# Patient Record
Sex: FEMALE | Race: BLACK OR AFRICAN AMERICAN | NOT HISPANIC OR LATINO | Employment: UNEMPLOYED | URBAN - METROPOLITAN AREA
[De-identification: names, ages, dates, MRNs, and addresses within clinical notes are randomized per-mention and may not be internally consistent; named-entity substitution may affect disease eponyms.]

---

## 2019-10-17 ENCOUNTER — HOSPITAL ENCOUNTER (EMERGENCY)
Facility: MEDICAL CENTER | Age: 34
End: 2019-10-18
Attending: EMERGENCY MEDICINE
Payer: COMMERCIAL

## 2019-10-17 DIAGNOSIS — L03.116 LEFT LEG CELLULITIS: ICD-10-CM

## 2019-10-17 PROCEDURE — 99285 EMERGENCY DEPT VISIT HI MDM: CPT

## 2019-10-17 PROCEDURE — 36415 COLL VENOUS BLD VENIPUNCTURE: CPT

## 2019-10-17 RX ORDER — SODIUM CHLORIDE 9 MG/ML
1000 INJECTION, SOLUTION INTRAVENOUS ONCE
Status: COMPLETED | OUTPATIENT
Start: 2019-10-18 | End: 2019-10-18

## 2019-10-17 RX ORDER — CLINDAMYCIN PHOSPHATE 600 MG/50ML
600 INJECTION, SOLUTION INTRAVENOUS ONCE
Status: DISCONTINUED | OUTPATIENT
Start: 2019-10-18 | End: 2019-10-18

## 2019-10-17 SDOH — HEALTH STABILITY: MENTAL HEALTH: HOW OFTEN DO YOU HAVE A DRINK CONTAINING ALCOHOL?: NOT ASKED

## 2019-10-18 VITALS
BODY MASS INDEX: 29.18 KG/M2 | OXYGEN SATURATION: 98 % | TEMPERATURE: 97.9 F | HEART RATE: 99 BPM | HEIGHT: 63 IN | RESPIRATION RATE: 20 BRPM | WEIGHT: 164.68 LBS | DIASTOLIC BLOOD PRESSURE: 59 MMHG | SYSTOLIC BLOOD PRESSURE: 105 MMHG

## 2019-10-18 PROCEDURE — A9270 NON-COVERED ITEM OR SERVICE: HCPCS | Performed by: EMERGENCY MEDICINE

## 2019-10-18 PROCEDURE — 700105 HCHG RX REV CODE 258: Performed by: EMERGENCY MEDICINE

## 2019-10-18 PROCEDURE — 700102 HCHG RX REV CODE 250 W/ 637 OVERRIDE(OP): Performed by: EMERGENCY MEDICINE

## 2019-10-18 PROCEDURE — 700101 HCHG RX REV CODE 250: Performed by: EMERGENCY MEDICINE

## 2019-10-18 PROCEDURE — 96374 THER/PROPH/DIAG INJ IV PUSH: CPT

## 2019-10-18 RX ORDER — SULFAMETHOXAZOLE AND TRIMETHOPRIM 800; 160 MG/1; MG/1
1 TABLET ORAL ONCE
Status: COMPLETED | OUTPATIENT
Start: 2019-10-18 | End: 2019-10-18

## 2019-10-18 RX ORDER — CEPHALEXIN 500 MG/1
500 CAPSULE ORAL 4 TIMES DAILY
Qty: 28 CAP | Refills: 0 | Status: SHIPPED | OUTPATIENT
Start: 2019-10-18 | End: 2019-10-25

## 2019-10-18 RX ORDER — SULFAMETHOXAZOLE AND TRIMETHOPRIM 800; 160 MG/1; MG/1
1 TABLET ORAL 2 TIMES DAILY
Qty: 14 TAB | Refills: 0 | Status: SHIPPED | OUTPATIENT
Start: 2019-10-18 | End: 2019-10-25

## 2019-10-18 RX ORDER — CEPHALEXIN 500 MG/1
1000 CAPSULE ORAL ONCE
Status: COMPLETED | OUTPATIENT
Start: 2019-10-18 | End: 2019-10-18

## 2019-10-18 RX ADMIN — CEPHALEXIN 1000 MG: 500 CAPSULE ORAL at 00:58

## 2019-10-18 RX ADMIN — SODIUM CHLORIDE 1000 ML: 9 INJECTION, SOLUTION INTRAVENOUS at 00:35

## 2019-10-18 RX ADMIN — CLINDAMYCIN IN 5 PERCENT DEXTROSE 600 MG: 12 INJECTION, SOLUTION INTRAVENOUS at 00:36

## 2019-10-18 RX ADMIN — SULFAMETHOXAZOLE AND TRIMETHOPRIM 1 TABLET: 800; 160 TABLET ORAL at 00:58

## 2019-10-18 NOTE — ED NOTES
Break RN: Pt given discharge instructions. Medication education provided. Pt aware that she needs to return to ED tomorrow to make sure s/s are not worsening.   Signed copy in chart. Pt states all belongings in possession. Pt ambulatory off unit with steady gait.

## 2019-10-18 NOTE — ED NOTES
Attempt to start an IV on pt to administer medications and draw blood without success. Charge called for an US guided IV, pt is informed and is okay with this intervention.

## 2019-10-18 NOTE — DISCHARGE INSTRUCTIONS
Please come back at 1:00 in the afternoon today Friday for repeat evaluation.  We need to take your vital signs look and make sure you are not getting worse.

## 2019-10-18 NOTE — ED PROVIDER NOTES
"ED Provider Note    CHIEF COMPLAINT  Chief Complaint   Patient presents with   • Leg Swelling     LLE x 3 days, possible spider bite per pt. Small puncture wound noted, + drainage.        HPI  Jazmin Tong is a 34 y.o. female who presents with a chief complaint of left leg swelling duration for the last 3 days it is constant is throbbing.  Worse when she walks better when she keeps it still no associated fever or chills.    There is some history and the chief complaint of possible spider bite but she states that she was simply told by family's and friend that she might have a spider but she never witnessed by nor does she have a single area that started swelling status initially started in her feet moved up to her knee    She states she had a history of skin infection about this bad about a year ago states she did not see a doctor and got better on its own she \"tough it out\".  She did start wearing new boots she states that she may have slept on it and scraped her skin but she gives no other further history.    REVIEW OF SYSTEMS  General: No fever or chills.  Eyes: No eye discharge. No eye pain.  Ear nose throat: No sore throat or  trouble swallowing.  Pulmonary: No shortness of breath or cough.  Cardiovascular: No chest pain or chest pressure.  GI: No abdominal pain nausea or vomiting.  : No dysuria or hematuria  Dermatologic: No rashes. No abrasions.  Neurologic: No weakness or numbness.      PAST MEDICAL HISTORY  History reviewed. No pertinent past medical history.    FAMILY HISTORY  History reviewed. No pertinent family history.    SOCIAL HISTORY  Social History     Socioeconomic History   • Marital status:      Spouse name: Not on file   • Number of children: Not on file   • Years of education: Not on file   • Highest education level: Not on file   Occupational History   • Not on file   Social Needs   • Financial resource strain: Not on file   • Food insecurity:     Worry: Not on file     Inability: " "Not on file   • Transportation needs:     Medical: Not on file     Non-medical: Not on file   Tobacco Use   • Smoking status: Current Every Day Smoker     Packs/day: 0.50     Types: Cigarettes   • Smokeless tobacco: Never Used   Substance and Sexual Activity   • Alcohol use: Not Currently   • Drug use: Never   • Sexual activity: Not on file   Lifestyle   • Physical activity:     Days per week: Not on file     Minutes per session: Not on file   • Stress: Not on file   Relationships   • Social connections:     Talks on phone: Not on file     Gets together: Not on file     Attends [employer] service: Not on file     Active member of club or organization: Not on file     Attends meetings of clubs or organizations: Not on file     Relationship status: Not on file   • Intimate partner violence:     Fear of current or ex partner: Not on file     Emotionally abused: Not on file     Physically abused: Not on file     Forced sexual activity: Not on file   Other Topics Concern   • Not on file   Social History Narrative   • Not on file       SURGICAL HISTORY  Past Surgical History:   Procedure Laterality Date   • GYN SURGERY       x2       CURRENT MEDICATIONS  Home Medications    **Home medications have not yet been reviewed for this encounter**          ALLERGIES  No Known Allergies    PHYSICAL EXAM  VITAL SIGNS: /70   Pulse (!) 110   Temp 36.6 °C (97.9 °F) (Temporal)   Resp 20   Ht 1.6 m (5' 3\")   Wt 74.7 kg (164 lb 10.9 oz)   SpO2 97%   BMI 29.17 kg/m²   Constitutional: Well developed, Well nourished, No acute distress, Non-toxic appearance.   HENT: Normocephalic, Atraumatic, Bilateral external ears normal, Oropharynx moist, No oral exudates, Nose normal.   Eyes: PERRLA, EOMI, Conjunctiva normal, No discharge.   Musculoskeletal: Good range of motion of the knee and ankle without difficulty.  Vascular: Good pedal pulses bilaterally.  Extremities are warm to touch.  Thorax & Lungs: No respiratory distress " no stridor  Abdomen: Nondistended nontender  Skin: M of the left leg is noticed extending from the foot all the way up to the knee.  No fluctuance no mass noted she has good range of motion.  There is no streaking noted.  As she    RADIOLOGY/PROCEDURES  Lab work was obtained    COURSE & MEDICAL DECISION MAKING  Pertinent Labs & Imaging studies reviewed. (See chart for details)  Cellulitis and otherwise healthy 34-year-old female had recurrent cellulitis this point there is some dry skin noted no significant wounds in her she is a small little abrasion just around her left tib-fib area.  She has also noted to have slight abrasion is healing over the anterior part of the ankle and foot area.  These could be the source is but at this point the patient will get IV antibiotics we will do CBC is otherwise healthy so we can probably send her home on some oral antibiotics and give her some fluid as well she has tachycardia noted    12:52 AM  Went back in there several times IVs were attempted and they blew the patient getting more agitated despite this patient actually heart rate actually down to normal and the patient actually was able to take oral hydration prep there is some some deep dehydration here she does not have a temperature she is borderline tachycardia and she has cellulitis this point the patient would rather just leave and to prevent her from leaving without getting antibiotics we agreed to oral antibiotics.  I like to recheck in 12 hours come back here for repeat examination she does have a swollen left leg is going up to the right below the knee at this point the patient is stable and like some take a set of vitals on her she will be sent home on Keflex and Septra.  Discharged home in stable condition    FINAL IMPRESSION  1.  Cellulitis left leg  2.   3.      Electronically signed by: Randy Hernandez, 10/17/2019 11:53 PM

## 2019-10-18 NOTE — ED TRIAGE NOTES
Chief Complaint   Patient presents with   • Leg Swelling     LLE x 3 days, possible spider bite per pt. Small puncture wound noted, + drainage.      Pt ambulatory to triage for above complaint. Obvious redness/swelling noted. Denies fever. Educated on triage process, encouraged to inform staff of any changes.

## 2020-07-18 ENCOUNTER — APPOINTMENT (OUTPATIENT)
Dept: RADIOLOGY | Facility: MEDICAL CENTER | Age: 35
DRG: 872 | End: 2020-07-18
Attending: EMERGENCY MEDICINE
Payer: COMMERCIAL

## 2020-07-18 ENCOUNTER — HOSPITAL ENCOUNTER (INPATIENT)
Facility: MEDICAL CENTER | Age: 35
LOS: 2 days | DRG: 872 | End: 2020-07-20
Attending: EMERGENCY MEDICINE | Admitting: HOSPITALIST
Payer: COMMERCIAL

## 2020-07-18 PROBLEM — A41.9 SEPSIS (HCC): Status: ACTIVE | Noted: 2020-07-18

## 2020-07-18 PROBLEM — F19.90 IV DRUG USER: Status: ACTIVE | Noted: 2020-07-18

## 2020-07-18 PROBLEM — Z72.0 TOBACCO ABUSE: Status: ACTIVE | Noted: 2020-07-18

## 2020-07-18 PROBLEM — F15.10 METHAMPHETAMINE ABUSE (HCC): Status: ACTIVE | Noted: 2020-07-18

## 2020-07-18 PROBLEM — L03.011 PARONYCHIA OF FINGER, RIGHT: Status: ACTIVE | Noted: 2020-07-18

## 2020-07-18 PROBLEM — E87.6 HYPOKALEMIA: Status: ACTIVE | Noted: 2020-07-18

## 2020-07-18 PROBLEM — L03.115 CELLULITIS OF RIGHT LEG: Status: ACTIVE | Noted: 2020-07-18

## 2020-07-18 PROBLEM — F11.10 HEROIN ABUSE (HCC): Status: ACTIVE | Noted: 2020-07-18

## 2020-07-18 LAB
ALBUMIN SERPL BCP-MCNC: 3.2 G/DL (ref 3.2–4.9)
ALBUMIN/GLOB SERPL: 0.6 G/DL
ALP SERPL-CCNC: 52 U/L (ref 30–99)
ALT SERPL-CCNC: 38 U/L (ref 2–50)
AMPHET UR QL SCN: POSITIVE
ANION GAP SERPL CALC-SCNC: 15 MMOL/L (ref 7–16)
APPEARANCE UR: ABNORMAL
APTT PPP: 29.1 SEC (ref 24.7–36)
AST SERPL-CCNC: 45 U/L (ref 12–45)
BACTERIA #/AREA URNS HPF: ABNORMAL /HPF
BARBITURATES UR QL SCN: NEGATIVE
BASOPHILS # BLD AUTO: 0.4 % (ref 0–1.8)
BASOPHILS # BLD: 0.1 K/UL (ref 0–0.12)
BENZODIAZ UR QL SCN: NEGATIVE
BILIRUB SERPL-MCNC: 0.6 MG/DL (ref 0.1–1.5)
BILIRUB UR QL STRIP.AUTO: NEGATIVE
BUN SERPL-MCNC: 13 MG/DL (ref 8–22)
BZE UR QL SCN: NEGATIVE
CALCIUM SERPL-MCNC: 9.1 MG/DL (ref 8.5–10.5)
CANNABINOIDS UR QL SCN: NEGATIVE
CHLORIDE SERPL-SCNC: 95 MMOL/L (ref 96–112)
CO2 SERPL-SCNC: 26 MMOL/L (ref 20–33)
COLOR UR: ABNORMAL
COVID ORDER STATUS COVID19: NORMAL
CREAT SERPL-MCNC: 0.76 MG/DL (ref 0.5–1.4)
EOSINOPHIL # BLD AUTO: 0.32 K/UL (ref 0–0.51)
EOSINOPHIL NFR BLD: 1.3 % (ref 0–6.9)
EPI CELLS #/AREA URNS HPF: ABNORMAL /HPF
ERYTHROCYTE [DISTWIDTH] IN BLOOD BY AUTOMATED COUNT: 49.8 FL (ref 35.9–50)
GLOBULIN SER CALC-MCNC: 5.7 G/DL (ref 1.9–3.5)
GLUCOSE SERPL-MCNC: 113 MG/DL (ref 65–99)
GLUCOSE UR STRIP.AUTO-MCNC: NEGATIVE MG/DL
HCG SERPL QL: NEGATIVE
HCT VFR BLD AUTO: 37.9 % (ref 37–47)
HGB BLD-MCNC: 11.6 G/DL (ref 12–16)
HYALINE CASTS #/AREA URNS LPF: ABNORMAL /LPF
IMM GRANULOCYTES # BLD AUTO: 0.48 K/UL (ref 0–0.11)
IMM GRANULOCYTES NFR BLD AUTO: 1.9 % (ref 0–0.9)
INR PPP: 1.09 (ref 0.87–1.13)
KETONES UR STRIP.AUTO-MCNC: NEGATIVE MG/DL
LACTATE BLD-SCNC: 1.6 MMOL/L (ref 0.5–2)
LACTATE BLD-SCNC: 2.8 MMOL/L (ref 0.5–2)
LEUKOCYTE ESTERASE UR QL STRIP.AUTO: NEGATIVE
LYMPHOCYTES # BLD AUTO: 2.88 K/UL (ref 1–4.8)
LYMPHOCYTES NFR BLD: 11.3 % (ref 22–41)
MAGNESIUM SERPL-MCNC: 2.1 MG/DL (ref 1.5–2.5)
MCH RBC QN AUTO: 26 PG (ref 27–33)
MCHC RBC AUTO-ENTMCNC: 30.6 G/DL (ref 33.6–35)
MCV RBC AUTO: 84.8 FL (ref 81.4–97.8)
METHADONE UR QL SCN: NEGATIVE
MICRO URNS: ABNORMAL
MONOCYTES # BLD AUTO: 1.94 K/UL (ref 0–0.85)
MONOCYTES NFR BLD AUTO: 7.6 % (ref 0–13.4)
NEUTROPHILS # BLD AUTO: 19.84 K/UL (ref 2–7.15)
NEUTROPHILS NFR BLD: 77.5 % (ref 44–72)
NITRITE UR QL STRIP.AUTO: POSITIVE
NRBC # BLD AUTO: 0 K/UL
NRBC BLD-RTO: 0 /100 WBC
OPIATES UR QL SCN: POSITIVE
OXYCODONE UR QL SCN: NEGATIVE
PCP UR QL SCN: NEGATIVE
PH UR STRIP.AUTO: 6 [PH] (ref 5–8)
PLATELET # BLD AUTO: 434 K/UL (ref 164–446)
PMV BLD AUTO: 10.7 FL (ref 9–12.9)
POTASSIUM SERPL-SCNC: 3.1 MMOL/L (ref 3.6–5.5)
PROPOXYPH UR QL SCN: NEGATIVE
PROT SERPL-MCNC: 8.9 G/DL (ref 6–8.2)
PROT UR QL STRIP: 100 MG/DL
PROTHROMBIN TIME: 14.4 SEC (ref 12–14.6)
RBC # BLD AUTO: 4.47 M/UL (ref 4.2–5.4)
RBC # URNS HPF: ABNORMAL /HPF
RBC UR QL AUTO: ABNORMAL
SODIUM SERPL-SCNC: 136 MMOL/L (ref 135–145)
SP GR UR STRIP.AUTO: 1.02
T4 FREE SERPL-MCNC: 1.67 NG/DL (ref 0.93–1.7)
TSH SERPL DL<=0.005 MIU/L-ACNC: 4.58 UIU/ML (ref 0.38–5.33)
UROBILINOGEN UR STRIP.AUTO-MCNC: >=8 MG/DL
WBC # BLD AUTO: 25.6 K/UL (ref 4.8–10.8)
WBC #/AREA URNS HPF: ABNORMAL /HPF

## 2020-07-18 PROCEDURE — 80307 DRUG TEST PRSMV CHEM ANLYZR: CPT

## 2020-07-18 PROCEDURE — 85730 THROMBOPLASTIN TIME PARTIAL: CPT

## 2020-07-18 PROCEDURE — 80048 BASIC METABOLIC PNL TOTAL CA: CPT

## 2020-07-18 PROCEDURE — 96367 TX/PROPH/DG ADDL SEQ IV INF: CPT

## 2020-07-18 PROCEDURE — 700111 HCHG RX REV CODE 636 W/ 250 OVERRIDE (IP): Performed by: HOSPITALIST

## 2020-07-18 PROCEDURE — 700111 HCHG RX REV CODE 636 W/ 250 OVERRIDE (IP): Performed by: EMERGENCY MEDICINE

## 2020-07-18 PROCEDURE — 770006 HCHG ROOM/CARE - MED/SURG/GYN SEMI*

## 2020-07-18 PROCEDURE — 73590 X-RAY EXAM OF LOWER LEG: CPT | Mod: RT

## 2020-07-18 PROCEDURE — 36415 COLL VENOUS BLD VENIPUNCTURE: CPT

## 2020-07-18 PROCEDURE — 96365 THER/PROPH/DIAG IV INF INIT: CPT

## 2020-07-18 PROCEDURE — 85025 COMPLETE CBC W/AUTO DIFF WBC: CPT | Mod: 91

## 2020-07-18 PROCEDURE — 700101 HCHG RX REV CODE 250: Performed by: HOSPITALIST

## 2020-07-18 PROCEDURE — 83735 ASSAY OF MAGNESIUM: CPT

## 2020-07-18 PROCEDURE — 83605 ASSAY OF LACTIC ACID: CPT

## 2020-07-18 PROCEDURE — 99223 1ST HOSP IP/OBS HIGH 75: CPT | Performed by: HOSPITALIST

## 2020-07-18 PROCEDURE — 84439 ASSAY OF FREE THYROXINE: CPT

## 2020-07-18 PROCEDURE — 80053 COMPREHEN METABOLIC PANEL: CPT

## 2020-07-18 PROCEDURE — 87040 BLOOD CULTURE FOR BACTERIA: CPT | Mod: 91

## 2020-07-18 PROCEDURE — 700105 HCHG RX REV CODE 258: Performed by: EMERGENCY MEDICINE

## 2020-07-18 PROCEDURE — 99285 EMERGENCY DEPT VISIT HI MDM: CPT

## 2020-07-18 PROCEDURE — 84443 ASSAY THYROID STIM HORMONE: CPT

## 2020-07-18 PROCEDURE — U0003 INFECTIOUS AGENT DETECTION BY NUCLEIC ACID (DNA OR RNA); SEVERE ACUTE RESPIRATORY SYNDROME CORONAVIRUS 2 (SARS-COV-2) (CORONAVIRUS DISEASE [COVID-19]), AMPLIFIED PROBE TECHNIQUE, MAKING USE OF HIGH THROUGHPUT TECHNOLOGIES AS DESCRIBED BY CMS-2020-01-R: HCPCS

## 2020-07-18 PROCEDURE — 93971 EXTREMITY STUDY: CPT | Mod: RT

## 2020-07-18 PROCEDURE — 700102 HCHG RX REV CODE 250 W/ 637 OVERRIDE(OP): Performed by: HOSPITALIST

## 2020-07-18 PROCEDURE — 85610 PROTHROMBIN TIME: CPT

## 2020-07-18 PROCEDURE — 81001 URINALYSIS AUTO W/SCOPE: CPT

## 2020-07-18 PROCEDURE — A9270 NON-COVERED ITEM OR SERVICE: HCPCS | Performed by: HOSPITALIST

## 2020-07-18 PROCEDURE — 87086 URINE CULTURE/COLONY COUNT: CPT

## 2020-07-18 PROCEDURE — 84703 CHORIONIC GONADOTROPIN ASSAY: CPT

## 2020-07-18 PROCEDURE — C9803 HOPD COVID-19 SPEC COLLECT: HCPCS | Performed by: HOSPITALIST

## 2020-07-18 RX ORDER — ACETAMINOPHEN 325 MG/1
650 TABLET ORAL EVERY 6 HOURS PRN
Status: DISCONTINUED | OUTPATIENT
Start: 2020-07-18 | End: 2020-07-20 | Stop reason: HOSPADM

## 2020-07-18 RX ORDER — SODIUM CHLORIDE AND POTASSIUM CHLORIDE 150; 900 MG/100ML; MG/100ML
INJECTION, SOLUTION INTRAVENOUS CONTINUOUS
Status: DISCONTINUED | OUTPATIENT
Start: 2020-07-18 | End: 2020-07-20 | Stop reason: HOSPADM

## 2020-07-18 RX ORDER — OXYCODONE HYDROCHLORIDE 10 MG/1
10 TABLET ORAL
Status: DISCONTINUED | OUTPATIENT
Start: 2020-07-18 | End: 2020-07-20 | Stop reason: HOSPADM

## 2020-07-18 RX ORDER — BISACODYL 10 MG
10 SUPPOSITORY, RECTAL RECTAL
Status: DISCONTINUED | OUTPATIENT
Start: 2020-07-18 | End: 2020-07-20 | Stop reason: HOSPADM

## 2020-07-18 RX ORDER — MORPHINE SULFATE 4 MG/ML
4 INJECTION, SOLUTION INTRAMUSCULAR; INTRAVENOUS
Status: DISCONTINUED | OUTPATIENT
Start: 2020-07-18 | End: 2020-07-20 | Stop reason: HOSPADM

## 2020-07-18 RX ORDER — POLYETHYLENE GLYCOL 3350 17 G/17G
1 POWDER, FOR SOLUTION ORAL
Status: DISCONTINUED | OUTPATIENT
Start: 2020-07-18 | End: 2020-07-20 | Stop reason: HOSPADM

## 2020-07-18 RX ORDER — PROCHLORPERAZINE EDISYLATE 5 MG/ML
5-10 INJECTION INTRAMUSCULAR; INTRAVENOUS EVERY 4 HOURS PRN
Status: DISCONTINUED | OUTPATIENT
Start: 2020-07-18 | End: 2020-07-20 | Stop reason: HOSPADM

## 2020-07-18 RX ORDER — SODIUM CHLORIDE, SODIUM LACTATE, POTASSIUM CHLORIDE, CALCIUM CHLORIDE 600; 310; 30; 20 MG/100ML; MG/100ML; MG/100ML; MG/100ML
1000 INJECTION, SOLUTION INTRAVENOUS ONCE
Status: COMPLETED | OUTPATIENT
Start: 2020-07-18 | End: 2020-07-18

## 2020-07-18 RX ORDER — CLINDAMYCIN PHOSPHATE 600 MG/50ML
600 INJECTION, SOLUTION INTRAVENOUS EVERY 8 HOURS
Status: DISCONTINUED | OUTPATIENT
Start: 2020-07-18 | End: 2020-07-20

## 2020-07-18 RX ORDER — ONDANSETRON 4 MG/1
4 TABLET, ORALLY DISINTEGRATING ORAL EVERY 4 HOURS PRN
Status: DISCONTINUED | OUTPATIENT
Start: 2020-07-18 | End: 2020-07-20 | Stop reason: HOSPADM

## 2020-07-18 RX ORDER — ONDANSETRON 2 MG/ML
4 INJECTION INTRAMUSCULAR; INTRAVENOUS EVERY 4 HOURS PRN
Status: DISCONTINUED | OUTPATIENT
Start: 2020-07-18 | End: 2020-07-20 | Stop reason: HOSPADM

## 2020-07-18 RX ORDER — ENALAPRILAT 1.25 MG/ML
1.25 INJECTION INTRAVENOUS EVERY 6 HOURS PRN
Status: DISCONTINUED | OUTPATIENT
Start: 2020-07-18 | End: 2020-07-20 | Stop reason: HOSPADM

## 2020-07-18 RX ORDER — SODIUM CHLORIDE, SODIUM LACTATE, POTASSIUM CHLORIDE, AND CALCIUM CHLORIDE .6; .31; .03; .02 G/100ML; G/100ML; G/100ML; G/100ML
500 INJECTION, SOLUTION INTRAVENOUS
Status: DISCONTINUED | OUTPATIENT
Start: 2020-07-18 | End: 2020-07-20 | Stop reason: HOSPADM

## 2020-07-18 RX ORDER — PROMETHAZINE HYDROCHLORIDE 25 MG/1
12.5-25 SUPPOSITORY RECTAL EVERY 4 HOURS PRN
Status: DISCONTINUED | OUTPATIENT
Start: 2020-07-18 | End: 2020-07-20 | Stop reason: HOSPADM

## 2020-07-18 RX ORDER — PROMETHAZINE HYDROCHLORIDE 25 MG/1
12.5-25 TABLET ORAL EVERY 4 HOURS PRN
Status: DISCONTINUED | OUTPATIENT
Start: 2020-07-18 | End: 2020-07-20 | Stop reason: HOSPADM

## 2020-07-18 RX ORDER — AMOXICILLIN 250 MG
2 CAPSULE ORAL 2 TIMES DAILY
Status: DISCONTINUED | OUTPATIENT
Start: 2020-07-18 | End: 2020-07-20 | Stop reason: HOSPADM

## 2020-07-18 RX ORDER — SODIUM CHLORIDE, SODIUM LACTATE, POTASSIUM CHLORIDE, AND CALCIUM CHLORIDE .6; .31; .03; .02 G/100ML; G/100ML; G/100ML; G/100ML
30 INJECTION, SOLUTION INTRAVENOUS
Status: DISCONTINUED | OUTPATIENT
Start: 2020-07-18 | End: 2020-07-20 | Stop reason: HOSPADM

## 2020-07-18 RX ORDER — OXYCODONE HYDROCHLORIDE 5 MG/1
5 TABLET ORAL
Status: DISCONTINUED | OUTPATIENT
Start: 2020-07-18 | End: 2020-07-20 | Stop reason: HOSPADM

## 2020-07-18 RX ADMIN — OXYCODONE HYDROCHLORIDE 10 MG: 10 TABLET ORAL at 21:17

## 2020-07-18 RX ADMIN — ENOXAPARIN SODIUM 40 MG: 40 INJECTION SUBCUTANEOUS at 19:55

## 2020-07-18 RX ADMIN — VANCOMYCIN HYDROCHLORIDE 1750 MG: 500 INJECTION, POWDER, LYOPHILIZED, FOR SOLUTION INTRAVENOUS at 17:37

## 2020-07-18 RX ADMIN — CLINDAMYCIN IN 5 PERCENT DEXTROSE 600 MG: 12 INJECTION, SOLUTION INTRAVENOUS at 23:38

## 2020-07-18 RX ADMIN — MORPHINE SULFATE 4 MG: 4 INJECTION INTRAVENOUS at 19:55

## 2020-07-18 RX ADMIN — SODIUM CHLORIDE, POTASSIUM CHLORIDE, SODIUM LACTATE AND CALCIUM CHLORIDE 1000 ML: 600; 310; 30; 20 INJECTION, SOLUTION INTRAVENOUS at 17:06

## 2020-07-18 RX ADMIN — POTASSIUM CHLORIDE AND SODIUM CHLORIDE: 900; 150 INJECTION, SOLUTION INTRAVENOUS at 19:56

## 2020-07-18 RX ADMIN — AMPICILLIN AND SULBACTAM 3 G: 2; 1 INJECTION, POWDER, FOR SOLUTION INTRAVENOUS at 17:06

## 2020-07-18 RX ADMIN — DOCUSATE SODIUM 50 MG AND SENNOSIDES 8.6 MG 2 TABLET: 8.6; 5 TABLET, FILM COATED ORAL at 19:55

## 2020-07-18 ASSESSMENT — ENCOUNTER SYMPTOMS
VOMITING: 0
HEADACHES: 0
NAUSEA: 0
CHILLS: 0
ABDOMINAL PAIN: 0
SHORTNESS OF BREATH: 0
PALPITATIONS: 0
FEVER: 0
STRIDOR: 0
DIZZINESS: 0
DIARRHEA: 0
MYALGIAS: 1
SORE THROAT: 0
EYE DISCHARGE: 0
SPEECH CHANGE: 0
NERVOUS/ANXIOUS: 0
BACK PAIN: 0
COUGH: 0

## 2020-07-18 ASSESSMENT — LIFESTYLE VARIABLES
DO YOU DRINK ALCOHOL: NO
SUBSTANCE_ABUSE: 1

## 2020-07-18 NOTE — ED PROVIDER NOTES
ED Provider Note    CHIEF COMPLAINT  Chief Complaint   Patient presents with   • Leg Swelling   • Wound Check   • Urinating Blood       HPI  Jazmin Tong is a 34 y.o. female with a history of injectable heroin abuse (reports skin popping and not intravenous use), last used earlier today (incidentally the patient also smokes methamphetamines however does not inject methamphetamines), who presents with a variety of medical complaints however her chief problem appears to be right lower extremity swelling and pain.  She states that she cut her leg shaving about a week ago and has developed severe swelling and pain over the past 5 or so days since then.  No history of DVT or PE.  No chest pain or trouble breathing.    The patient has had a history of skin infections in the past.  Does not inject into her lower extremities.  No prior history of endocarditis.  No fevers.    Patient also notes some healing wounds to her upper extremities from abscesses that have since drained including one to her left shoulder.  Also has history of bilateral infections to second digit at the base of the nail that has since drained purulent material.    Patient also notes some slight flank pain and believes that she urinated blood earlier today.    REVIEW OF SYSTEMS  See HPI for further details. All other systems are negative.     PAST MEDICAL HISTORY       SOCIAL HISTORY  Social History     Tobacco Use   • Smoking status: Current Every Day Smoker     Packs/day: 0.50     Types: Cigarettes   • Smokeless tobacco: Never Used   Substance and Sexual Activity   • Alcohol use: Not Currently   • Drug use: Yes     Types: Injected (Skin Popping)     Comment: heroin/ meth    • Sexual activity: Not on file       SURGICAL HISTORY   has a past surgical history that includes gyn surgery.    CURRENT MEDICATIONS  Home Medications     Reviewed by Connor Figueroa (Pharmacy Tech) on 07/18/20 at 1701  Med List Status: Complete   Medication Last Dose  "Status   levonorgestrel (MIRENA, 52 MG,) 52 mg (20 mcg/24 hr) IUD 2013 Active                ALLERGIES  No Known Allergies    PHYSICAL EXAM  VITAL SIGNS: /71   Pulse (!) 108   Temp 36.1 °C (97 °F) (Temporal)   Resp 18   Ht 1.6 m (5' 3\")   Wt 74.4 kg (164 lb)   SpO2 97%   BMI 29.05 kg/m²   Pulse ox interpretation: I interpret this pulse ox as normal.  Constitutional: Alert in no apparent distress.  HENT: No signs of trauma, Bilateral external ears normal, Nose normal.   Eyes: Pupils are equal and reactive, Conjunctiva normal, Non-icteric.   Neck: Normal range of motion, No tenderness, Supple, No stridor.   Cardiovascular: Regular rate and rhythm.   Thorax & Lungs: Normal breath sounds, No respiratory distress, No wheezing, No chest tenderness.   Abdomen: Bowel sounds normal, Soft, No tenderness, No masses, No pulsatile masses. No peritoneal signs.  Skin: Warm, Dry, right lower leg erythema, No rash.  Erythema to the right lower extremity involving the lower leg below the knee.  Small 2 cm abrasion to the anterior shin.  No subcutaneous emphysema or woody edema.  Back: No bony tenderness, No CVA tenderness.   Extremities: Intact distal pulses, No edema, right lower extremity tenderness, No cyanosis.  Healing wounds to bilateral second digits at the base of the nail consistent with healing paronychia after spontaneous drainage.  No purulent drainage or surrounding erythema or swelling  Musculoskeletal: Good range of motion in all major joints. No  major deformities noted.   Neurologic: Alert, No focal deficits noted.       DIAGNOSTIC STUDIES / PROCEDURES    LABS  Labs Reviewed   CBC WITH DIFFERENTIAL - Abnormal; Notable for the following components:       Result Value    WBC 25.6 (*)     Hemoglobin 11.6 (*)     MCH 26.0 (*)     MCHC 30.6 (*)     Neutrophils-Polys 77.50 (*)     Lymphocytes 11.30 (*)     Immature Granulocytes 1.90 (*)     Neutrophils (Absolute) 19.84 (*)     Monos (Absolute) 1.94 (*)     " Immature Granulocytes (abs) 0.48 (*)     All other components within normal limits    Narrative:     Indicate which anticoagulants the patient is on:->NONE   COMP METABOLIC PANEL - Abnormal; Notable for the following components:    Potassium 3.1 (*)     Chloride 95 (*)     Glucose 113 (*)     Total Protein 8.9 (*)     Globulin 5.7 (*)     All other components within normal limits    Narrative:     Indicate which anticoagulants the patient is on:->NONE   URINALYSIS,CULTURE IF INDICATED - Abnormal; Notable for the following components:    Character Cloudy (*)     Protein 100 (*)     Nitrite Positive (*)     Occult Blood Large (*)     All other components within normal limits   URINE DRUG SCREEN - Abnormal; Notable for the following components:    Amphetamines Urine Positive (*)     Opiates Positive (*)     All other components within normal limits   LACTIC ACID - Abnormal; Notable for the following components:    Lactic Acid 2.8 (*)     All other components within normal limits   URINE MICROSCOPIC (W/UA) - Abnormal; Notable for the following components:    WBC 10-20 (*)     RBC  (*)     Bacteria Few (*)     Hyaline Cast 6-10 (*)     All other components within normal limits   CBC WITH DIFFERENTIAL - Abnormal; Notable for the following components:    WBC 22.6 (*)     RBC 3.90 (*)     Hemoglobin 10.0 (*)     Hematocrit 32.4 (*)     MCH 25.6 (*)     MCHC 30.9 (*)     Neutrophils-Polys 76.90 (*)     Lymphocytes 11.70 (*)     Immature Granulocytes 2.00 (*)     Neutrophils (Absolute) 17.36 (*)     Monos (Absolute) 1.69 (*)     Immature Granulocytes (abs) 0.46 (*)     All other components within normal limits   BASIC METABOLIC PANEL - Abnormal; Notable for the following components:    Potassium 3.4 (*)     Creatinine 0.46 (*)     Calcium 8.1 (*)     All other components within normal limits   LACTIC ACID   APTT    Narrative:     Indicate which anticoagulants the patient is on:->NONE   PROTHROMBIN TIME    Narrative:      "Indicate which anticoagulants the patient is on:->NONE   BLOOD CULTURE    Narrative:     Per Hospital Policy: Only change Specimen Src: to \"Line\" if  specified by physician order.  No site indicated   BLOOD CULTURE    Narrative:     Per Hospital Policy: Only change Specimen Src: to \"Line\" if  specified by physician order.  No site indicated   HCG QUAL SERUM   TSH   ESTIMATED GFR    Narrative:     Indicate which anticoagulants the patient is on:->NONE   FREE THYROXINE   COVID/SARS COV-2    Narrative:     Is patient being admitted?->Yes  Does this patient meet criteria for Rush/Cepheid per [facility]  Inpatient Workflow? (See workflow link below)->No  Expected turn around time?->Routine (In-House PCR up to 24  hours)   SARS-COV-2, PCR (IN-HOUSE)    Narrative:     Is patient being admitted?->Yes  Does this patient meet criteria for Rush/Cepheid per [facility]  Inpatient Workflow? (See workflow link below)->No  Expected turn around time?->Routine (In-House PCR up to 24  hours)   MAGNESIUM   LACTIC ACID   LACTIC ACID   URINE CULTURE(NEW)   ESTIMATED GFR   LACTIC ACID   CBC WITH DIFFERENTIAL   BASIC METABOLIC PANEL   MAGNESIUM         RADIOLOGY  US-EXTREMITY VENOUS LOWER UNILAT RIGHT   Final Result      DX-TIBIA AND FIBULA RIGHT   Final Result      Diffuse soft tissue swelling without evidence of bony abnormality.                  INTERPRETING LOCATION:  [address] [place] [address], [id number]            COURSE & MEDICAL DECISION MAKING    Medications   senna-docusate (PERICOLACE or SENOKOT S) 8.6-50 MG per tablet 2 Tab (2 Tabs Oral Given 7/19/20 2801)     And   polyethylene glycol/lytes (MIRALAX) PACKET 1 Packet (has no administration in time range)     And   magnesium hydroxide (MILK OF MAGNESIA) suspension 30 mL (has no administration in time range)     And   bisacodyl (DULCOLAX) suppository 10 mg (has no administration in time range)   0.9 % NaCl with KCl 20 mEq infusion ( Intravenous New Bag 7/19/20 0906)   enoxaparin (LOVENOX) inj 40 " mg (40 mg Subcutaneous Given 7/19/20 0911)   acetaminophen (TYLENOL) tablet 650 mg (has no administration in time range)   Pharmacy Consult Request ...Pain Management Review 1 Each (has no administration in time range)     And   oxyCODONE immediate-release (ROXICODONE) tablet 5 mg (has no administration in time range)     And   oxyCODONE immediate release (ROXICODONE) tablet 10 mg (10 mg Oral Given 7/19/20 0731)     And   morphine (pf) 4 MG/ML injection 4 mg (4 mg Intravenous Given 7/19/20 0903)   ondansetron (ZOFRAN) syringe/vial injection 4 mg (has no administration in time range)   ondansetron (ZOFRAN ODT) dispertab 4 mg (has no administration in time range)   promethazine (PHENERGAN) tablet 12.5-25 mg (has no administration in time range)   promethazine (PHENERGAN) suppository 12.5-25 mg (has no administration in time range)   prochlorperazine (COMPAZINE) injection 5-10 mg (has no administration in time range)   enalaprilat (VASOTEC) injection 1.25 mg (has no administration in time range)   ampicillin/sulbactam (UNASYN) 3 g in  mL IVPB (0 g Intravenous Stopped 7/19/20 0754)   MD Alert...Vancomycin per Pharmacy (has no administration in time range)   lactated ringers infusion (BOLUS): BMI less than or equal to 30 (has no administration in time range)   lactated ringers infusion (BOLUS) (has no administration in time range)   vancomycin (VANCOCIN) 1,000 mg in  mL IVPB (1,000 mg Intravenous New Bag 7/19/20 0930)   clindamycin (CLEOCIN) IVPB premix 600 mg (0 mg Intravenous Stopped 7/19/20 0459)   ampicillin/sulbactam (UNASYN) 3 g in  mL IVPB (0 g Intravenous Stopped 7/18/20 1807)   lactated ringers infusion (BOLUS) (0 mL Intravenous Stopped 7/18/20 1807)   vancomycin (VANCOCIN) 1,750 mg in  mL IVPB (0 mg Intravenous Stopped 7/18/20 2037)       Pertinent Labs & Imaging studies reviewed. (See chart for details)  34 y.o. female with a history of injectable heroin abuse and history of smoking  "methamphetamines, last use earlier today, presenting with right lower extremity pain and swelling for about 5 days now.  No active fevers however the patient is tachycardic and has profound leukocytosis.  Meet sepsis criteria.  She was given IV fluid hydration and IV antibiotics for concerns of right lower extremity cellulitis.  Has other signs of skin infections in various stages of healing including a small area of induration to the left shoulder without any signs of fluctuance and bilateral paronychia that has since spontaneously drained and do not appear to be cellulitic at this time.    Incidentally, the patient also noted hematuria of uncertain significance.  Does not have symptoms of renal colic at this time such as colicky significant flank pain or vomiting.  We will continue to monitor.  No signs of coagulopathy.  Does not appear to be bleeding from anywhere else.  Possible that it is secondary to a urinary infection.  She has been given IV antibiotics to address her right lower extremity infection.    With regards to the patient's right lower extremity, suspect cellulitis.  No obvious focal area of fluctuance.  X-ray was performed that did not show subcutaneous air.  No crepitance of the skin or woody edema.  Normal sodium, creatinine, glucose.  Necrotizing fasciitis is a possibility though lower suspicion at this moment and will continue to closely monitor this patient.    HYDRATION: Based on the patient's presentation of Sepsis the patient was given IV fluids. IV Hydration was used because oral hydration was not as rapid as required. Upon recheck following hydration, the patient was improved.    The total critical care time on this patient is 35 minutes, resuscitating patient, speaking with admitting physician, and deciphering test results. This 35 minutes is exclusive of separately billable procedures.      /71   Pulse (!) 108   Temp 36.1 °C (97 °F) (Temporal)   Resp 18   Ht 1.6 m (5' 3\")   " Wt 74.4 kg (164 lb)   SpO2 97%   BMI 29.05 kg/m²       FINAL IMPRESSION  Right lower extremity cellulitis  Sepsis  Injectable drug abuse  Hematuria      Electronically signed by: Guilherme Arreguin M.D., 7/18/2020 4:11 PM

## 2020-07-18 NOTE — ED TRIAGE NOTES
"Pt to triage, c/o RLE swelling x 5 days. + swelling noted with open area/ pt also states her fingers are becoming infected from her acrylic nails. Pt states \" she just went to the bathroom and is urinating blood\" . Pt is not well groomed. Admits to heroin and meth use. Last used today   "

## 2020-07-18 NOTE — ED NOTES
Pt to rm, changed into gown. RLE taut/swollen. PIV initiated via primary RN with US technique, blood drawn and sent to lab including one set of cultures.

## 2020-07-19 PROBLEM — N30.01 ACUTE CYSTITIS WITH HEMATURIA: Status: ACTIVE | Noted: 2020-07-19

## 2020-07-19 LAB
ANION GAP SERPL CALC-SCNC: 11 MMOL/L (ref 7–16)
ANION GAP SERPL CALC-SCNC: 11 MMOL/L (ref 7–16)
BASOPHILS # BLD AUTO: 0.3 % (ref 0–1.8)
BASOPHILS # BLD AUTO: 0.3 % (ref 0–1.8)
BASOPHILS # BLD: 0.06 K/UL (ref 0–0.12)
BASOPHILS # BLD: 0.07 K/UL (ref 0–0.12)
BUN SERPL-MCNC: 8 MG/DL (ref 8–22)
BUN SERPL-MCNC: 9 MG/DL (ref 8–22)
CALCIUM SERPL-MCNC: 8.1 MG/DL (ref 8.5–10.5)
CALCIUM SERPL-MCNC: 8.1 MG/DL (ref 8.5–10.5)
CHLORIDE SERPL-SCNC: 100 MMOL/L (ref 96–112)
CHLORIDE SERPL-SCNC: 98 MMOL/L (ref 96–112)
CO2 SERPL-SCNC: 25 MMOL/L (ref 20–33)
CO2 SERPL-SCNC: 26 MMOL/L (ref 20–33)
CREAT SERPL-MCNC: 0.44 MG/DL (ref 0.5–1.4)
CREAT SERPL-MCNC: 0.46 MG/DL (ref 0.5–1.4)
EOSINOPHIL # BLD AUTO: 0.37 K/UL (ref 0–0.51)
EOSINOPHIL # BLD AUTO: 0.38 K/UL (ref 0–0.51)
EOSINOPHIL NFR BLD: 1.6 % (ref 0–6.9)
EOSINOPHIL NFR BLD: 1.8 % (ref 0–6.9)
ERYTHROCYTE [DISTWIDTH] IN BLOOD BY AUTOMATED COUNT: 48.6 FL (ref 35.9–50)
ERYTHROCYTE [DISTWIDTH] IN BLOOD BY AUTOMATED COUNT: 49.7 FL (ref 35.9–50)
GLUCOSE SERPL-MCNC: 80 MG/DL (ref 65–99)
GLUCOSE SERPL-MCNC: 94 MG/DL (ref 65–99)
HCT VFR BLD AUTO: 32.4 % (ref 37–47)
HCT VFR BLD AUTO: 32.9 % (ref 37–47)
HGB BLD-MCNC: 10 G/DL (ref 12–16)
HGB BLD-MCNC: 10 G/DL (ref 12–16)
IMM GRANULOCYTES # BLD AUTO: 0.41 K/UL (ref 0–0.11)
IMM GRANULOCYTES # BLD AUTO: 0.46 K/UL (ref 0–0.11)
IMM GRANULOCYTES NFR BLD AUTO: 1.9 % (ref 0–0.9)
IMM GRANULOCYTES NFR BLD AUTO: 2 % (ref 0–0.9)
LACTATE BLD-SCNC: 1 MMOL/L (ref 0.5–2)
LACTATE BLD-SCNC: 1 MMOL/L (ref 0.5–2)
LACTATE BLD-SCNC: 1.1 MMOL/L (ref 0.5–2)
LYMPHOCYTES # BLD AUTO: 2.23 K/UL (ref 1–4.8)
LYMPHOCYTES # BLD AUTO: 2.63 K/UL (ref 1–4.8)
LYMPHOCYTES NFR BLD: 10.5 % (ref 22–41)
LYMPHOCYTES NFR BLD: 11.7 % (ref 22–41)
MAGNESIUM SERPL-MCNC: 1.9 MG/DL (ref 1.5–2.5)
MCH RBC QN AUTO: 25.6 PG (ref 27–33)
MCH RBC QN AUTO: 25.7 PG (ref 27–33)
MCHC RBC AUTO-ENTMCNC: 30.4 G/DL (ref 33.6–35)
MCHC RBC AUTO-ENTMCNC: 30.9 G/DL (ref 33.6–35)
MCV RBC AUTO: 83.1 FL (ref 81.4–97.8)
MCV RBC AUTO: 84.6 FL (ref 81.4–97.8)
MONOCYTES # BLD AUTO: 1.51 K/UL (ref 0–0.85)
MONOCYTES # BLD AUTO: 1.69 K/UL (ref 0–0.85)
MONOCYTES NFR BLD AUTO: 7.1 % (ref 0–13.4)
MONOCYTES NFR BLD AUTO: 7.5 % (ref 0–13.4)
NEUTROPHILS # BLD AUTO: 16.64 K/UL (ref 2–7.15)
NEUTROPHILS # BLD AUTO: 17.36 K/UL (ref 2–7.15)
NEUTROPHILS NFR BLD: 76.9 % (ref 44–72)
NEUTROPHILS NFR BLD: 78.4 % (ref 44–72)
NRBC # BLD AUTO: 0 K/UL
NRBC # BLD AUTO: 0 K/UL
NRBC BLD-RTO: 0 /100 WBC
NRBC BLD-RTO: 0 /100 WBC
PLATELET # BLD AUTO: 367 K/UL (ref 164–446)
PLATELET # BLD AUTO: 368 K/UL (ref 164–446)
PMV BLD AUTO: 10.1 FL (ref 9–12.9)
PMV BLD AUTO: 10.8 FL (ref 9–12.9)
POTASSIUM SERPL-SCNC: 3.4 MMOL/L (ref 3.6–5.5)
POTASSIUM SERPL-SCNC: 3.4 MMOL/L (ref 3.6–5.5)
RBC # BLD AUTO: 3.89 M/UL (ref 4.2–5.4)
RBC # BLD AUTO: 3.9 M/UL (ref 4.2–5.4)
SODIUM SERPL-SCNC: 135 MMOL/L (ref 135–145)
SODIUM SERPL-SCNC: 136 MMOL/L (ref 135–145)
WBC # BLD AUTO: 21.2 K/UL (ref 4.8–10.8)
WBC # BLD AUTO: 22.6 K/UL (ref 4.8–10.8)

## 2020-07-19 PROCEDURE — 99232 SBSQ HOSP IP/OBS MODERATE 35: CPT | Performed by: HOSPITALIST

## 2020-07-19 PROCEDURE — 700101 HCHG RX REV CODE 250: Performed by: HOSPITALIST

## 2020-07-19 PROCEDURE — 83605 ASSAY OF LACTIC ACID: CPT

## 2020-07-19 PROCEDURE — 770006 HCHG ROOM/CARE - MED/SURG/GYN SEMI*

## 2020-07-19 PROCEDURE — 36415 COLL VENOUS BLD VENIPUNCTURE: CPT

## 2020-07-19 PROCEDURE — 700105 HCHG RX REV CODE 258

## 2020-07-19 PROCEDURE — 83735 ASSAY OF MAGNESIUM: CPT

## 2020-07-19 PROCEDURE — 80048 BASIC METABOLIC PNL TOTAL CA: CPT

## 2020-07-19 PROCEDURE — 700102 HCHG RX REV CODE 250 W/ 637 OVERRIDE(OP): Performed by: HOSPITALIST

## 2020-07-19 PROCEDURE — 700105 HCHG RX REV CODE 258: Performed by: HOSPITALIST

## 2020-07-19 PROCEDURE — 85025 COMPLETE CBC W/AUTO DIFF WBC: CPT

## 2020-07-19 PROCEDURE — A9270 NON-COVERED ITEM OR SERVICE: HCPCS | Performed by: HOSPITALIST

## 2020-07-19 PROCEDURE — 700111 HCHG RX REV CODE 636 W/ 250 OVERRIDE (IP): Performed by: HOSPITALIST

## 2020-07-19 RX ORDER — SODIUM CHLORIDE 9 MG/ML
INJECTION, SOLUTION INTRAVENOUS
Status: COMPLETED
Start: 2020-07-19 | End: 2020-07-19

## 2020-07-19 RX ADMIN — SODIUM CHLORIDE 500 ML: 9 INJECTION, SOLUTION INTRAVENOUS at 20:10

## 2020-07-19 RX ADMIN — POTASSIUM CHLORIDE AND SODIUM CHLORIDE: 900; 150 INJECTION, SOLUTION INTRAVENOUS at 09:06

## 2020-07-19 RX ADMIN — VANCOMYCIN HYDROCHLORIDE 1000 MG: 500 INJECTION, POWDER, LYOPHILIZED, FOR SOLUTION INTRAVENOUS at 03:16

## 2020-07-19 RX ADMIN — VANCOMYCIN HYDROCHLORIDE 1000 MG: 500 INJECTION, POWDER, LYOPHILIZED, FOR SOLUTION INTRAVENOUS at 09:30

## 2020-07-19 RX ADMIN — MORPHINE SULFATE 4 MG: 4 INJECTION INTRAVENOUS at 09:03

## 2020-07-19 RX ADMIN — OXYCODONE HYDROCHLORIDE 10 MG: 10 TABLET ORAL at 17:08

## 2020-07-19 RX ADMIN — SODIUM CHLORIDE 3 G: 900 INJECTION INTRAVENOUS at 13:26

## 2020-07-19 RX ADMIN — SODIUM CHLORIDE 3 G: 900 INJECTION INTRAVENOUS at 20:11

## 2020-07-19 RX ADMIN — OXYCODONE HYDROCHLORIDE 10 MG: 10 TABLET ORAL at 20:11

## 2020-07-19 RX ADMIN — OXYCODONE HYDROCHLORIDE 10 MG: 10 TABLET ORAL at 07:31

## 2020-07-19 RX ADMIN — CLINDAMYCIN IN 5 PERCENT DEXTROSE 600 MG: 12 INJECTION, SOLUTION INTRAVENOUS at 14:07

## 2020-07-19 RX ADMIN — MORPHINE SULFATE 4 MG: 4 INJECTION INTRAVENOUS at 14:07

## 2020-07-19 RX ADMIN — CLINDAMYCIN IN 5 PERCENT DEXTROSE 600 MG: 12 INJECTION, SOLUTION INTRAVENOUS at 04:29

## 2020-07-19 RX ADMIN — OXYCODONE HYDROCHLORIDE 10 MG: 10 TABLET ORAL at 11:56

## 2020-07-19 RX ADMIN — OXYCODONE HYDROCHLORIDE 10 MG: 10 TABLET ORAL at 04:29

## 2020-07-19 RX ADMIN — ENOXAPARIN SODIUM 40 MG: 40 INJECTION SUBCUTANEOUS at 09:11

## 2020-07-19 RX ADMIN — CLINDAMYCIN IN 5 PERCENT DEXTROSE 600 MG: 12 INJECTION, SOLUTION INTRAVENOUS at 23:14

## 2020-07-19 RX ADMIN — SODIUM CHLORIDE 3 G: 900 INJECTION INTRAVENOUS at 07:24

## 2020-07-19 RX ADMIN — OXYCODONE HYDROCHLORIDE 10 MG: 10 TABLET ORAL at 23:15

## 2020-07-19 RX ADMIN — SODIUM CHLORIDE 3 G: 900 INJECTION INTRAVENOUS at 01:43

## 2020-07-19 RX ADMIN — DOCUSATE SODIUM 50 MG AND SENNOSIDES 8.6 MG 2 TABLET: 8.6; 5 TABLET, FILM COATED ORAL at 04:29

## 2020-07-19 RX ADMIN — VANCOMYCIN HYDROCHLORIDE 1000 MG: 500 INJECTION, POWDER, LYOPHILIZED, FOR SOLUTION INTRAVENOUS at 17:08

## 2020-07-19 ASSESSMENT — COGNITIVE AND FUNCTIONAL STATUS - GENERAL
MOVING TO AND FROM BED TO CHAIR: A LITTLE
SUGGESTED CMS G CODE MODIFIER MOBILITY: CK
CLIMB 3 TO 5 STEPS WITH RAILING: A LITTLE
MOBILITY SCORE: 19
WALKING IN HOSPITAL ROOM: A LITTLE
TOILETING: A LITTLE
DRESSING REGULAR UPPER BODY CLOTHING: A LITTLE
DAILY ACTIVITIY SCORE: 22
SUGGESTED CMS G CODE MODIFIER DAILY ACTIVITY: CJ
MOVING FROM LYING ON BACK TO SITTING ON SIDE OF FLAT BED: A LITTLE
STANDING UP FROM CHAIR USING ARMS: A LITTLE

## 2020-07-19 ASSESSMENT — ENCOUNTER SYMPTOMS
MYALGIAS: 1
NERVOUS/ANXIOUS: 1
VOMITING: 0
NAUSEA: 0
SHORTNESS OF BREATH: 0
ABDOMINAL PAIN: 0
DIARRHEA: 1
FOCAL WEAKNESS: 0
FALLS: 0
HALLUCINATIONS: 0
PALPITATIONS: 0
HEADACHES: 0
CHILLS: 0
DIAPHORESIS: 1
LOSS OF CONSCIOUSNESS: 0
DIZZINESS: 0
FEVER: 0

## 2020-07-19 ASSESSMENT — PATIENT HEALTH QUESTIONNAIRE - PHQ9
2. FEELING DOWN, DEPRESSED, IRRITABLE, OR HOPELESS: NOT AT ALL
1. LITTLE INTEREST OR PLEASURE IN DOING THINGS: NOT AT ALL
SUM OF ALL RESPONSES TO PHQ9 QUESTIONS 1 AND 2: 0

## 2020-07-19 ASSESSMENT — LIFESTYLE VARIABLES
TOTAL SCORE: 0
HAVE PEOPLE ANNOYED YOU BY CRITICIZING YOUR DRINKING: NO
EVER HAD A DRINK FIRST THING IN THE MORNING TO STEADY YOUR NERVES TO GET RID OF A HANGOVER: NO
AVERAGE NUMBER OF DAYS PER WEEK YOU HAVE A DRINK CONTAINING ALCOHOL: 0
TOTAL SCORE: 0
SUBSTANCE_ABUSE: 1
DOES PATIENT WANT TO STOP DRINKING: NO
EVER_SMOKED: YES
HOW MANY TIMES IN THE PAST YEAR HAVE YOU HAD 5 OR MORE DRINKS IN A DAY: 0
EVER FELT BAD OR GUILTY ABOUT YOUR DRINKING: NO
TOTAL SCORE: 0
HAVE YOU EVER FELT YOU SHOULD CUT DOWN ON YOUR DRINKING: NO
ON A TYPICAL DAY WHEN YOU DRINK ALCOHOL HOW MANY DRINKS DO YOU HAVE: 0
CONSUMPTION TOTAL: NEGATIVE
ALCOHOL_USE: NO

## 2020-07-19 ASSESSMENT — COPD QUESTIONNAIRES
DO YOU EVER COUGH UP ANY MUCUS OR PHLEGM?: NO/ONLY WITH OCCASIONAL COLDS OR INFECTIONS
COPD SCREENING SCORE: 0
HAVE YOU SMOKED AT LEAST 100 CIGARETTES IN YOUR ENTIRE LIFE: NO/DON'T KNOW
IN THE PAST 12 MONTHS DO YOU DO LESS THAN YOU USED TO BECAUSE OF YOUR BREATHING PROBLEMS: DISAGREE/UNSURE
DURING THE PAST 4 WEEKS HOW MUCH DID YOU FEEL SHORT OF BREATH: NONE/LITTLE OF THE TIME

## 2020-07-19 NOTE — CARE PLAN
Problem: Infection  Goal: Will remain free from infection  Outcome: PROGRESSING AS EXPECTED  Afebrile, on multiple IV antibiotics.      Problem: Pain Management  Goal: Pain level will decrease to patient's comfort goal  Outcome: PROGRESSING AS EXPECTED   Patient states will need pain management with IV and oral pain medication.

## 2020-07-19 NOTE — PROGRESS NOTES
"[facility] Medicine Daily Progress Note    Date of Service  7/19/2020    Chief Complaint  34 y.o. female admitted 7/18/2020 with hematuria and right lower extremity swelling.    Interval Problem Update  She feels \"the same\" today but feels that she is starting to withdraw.  Last used IV heroin on the morning of 7/18.    Consultants/Specialty  None    Code Status  Full    Disposition  Anticipate home in 1 to 2 days.    Review of Systems  Review of Systems   Constitutional: Positive for diaphoresis and malaise/fatigue. Negative for chills and fever.   Respiratory: Negative for shortness of breath.    Cardiovascular: Positive for leg swelling. Negative for chest pain and palpitations.   Gastrointestinal: Positive for diarrhea. Negative for abdominal pain, nausea and vomiting.   Genitourinary: Negative for dysuria.   Musculoskeletal: Positive for myalgias. Negative for falls.   Neurological: Negative for dizziness, focal weakness, loss of consciousness and headaches.   Psychiatric/Behavioral: Positive for substance abuse. Negative for hallucinations and suicidal ideas. The patient is nervous/anxious.    All other systems reviewed and are negative.       Physical Exam  Temp:  [36.1 °C (97 °F)-37.7 °C (99.8 °F)] 37.1 °C (98.8 °F)  Pulse:  [103-121] 103  Resp:  [18-20] 20  BP: ()/(50-73) 98/66  SpO2:  [93 %-99 %] 93 %    Physical Exam  Vitals signs reviewed.   Constitutional:       General: She is not in acute distress.     Appearance: She is ill-appearing and diaphoretic.   HENT:      Head: Normocephalic.      Mouth/Throat:      Mouth: Mucous membranes are dry.   Eyes:      General:         Right eye: No discharge.         Left eye: No discharge.      Extraocular Movements: Extraocular movements intact.   Neck:      Musculoskeletal: Normal range of motion. No muscular tenderness.   Cardiovascular:      Rate and Rhythm: Normal rate and regular rhythm.      Pulses: Normal pulses.   Pulmonary:      Effort: Pulmonary " effort is normal. No respiratory distress.      Breath sounds: No wheezing.      Comments: Diminished at the bases bilaterally  Abdominal:      General: There is no distension.      Palpations: Abdomen is soft.      Tenderness: There is no abdominal tenderness. There is no guarding.   Musculoskeletal:         General: Tenderness present.      Right lower leg: Edema (With warmth and erythema) present.   Skin:     Coloration: Skin is not jaundiced.      Comments: Right upper extremity with small area of induration, prior injection site but no purulent drainage   Neurological:      General: No focal deficit present.      Mental Status: She is alert and oriented to person, place, and time.   Psychiatric:         Mood and Affect: Mood normal.         Speech: Speech normal.         Behavior: Behavior normal. Behavior is cooperative.         Fluids    Intake/Output Summary (Last 24 hours) at 7/19/2020 0748  Last data filed at 7/18/2020 1807  Gross per 24 hour   Intake 1100 ml   Output --   Net 1100 ml       Laboratory  Recent Labs     07/18/20  1548 07/18/20  2355   WBC 25.6* 22.6*   RBC 4.47 3.90*   HEMOGLOBIN 11.6* 10.0*   HEMATOCRIT 37.9 32.4*   MCV 84.8 83.1   MCH 26.0* 25.6*   MCHC 30.6* 30.9*   RDW 49.8 48.6   PLATELETCT 434 367   MPV 10.7 10.1     Recent Labs     07/18/20  1548 07/18/20  2355   SODIUM 136 135   POTASSIUM 3.1* 3.4*   CHLORIDE 95* 98   CO2 26 26   GLUCOSE 113* 94   BUN 13 9   CREATININE 0.76 0.46*   CALCIUM 9.1 8.1*     Recent Labs     07/18/20  1548   APTT 29.1   INR 1.09               Imaging  US-EXTREMITY VENOUS LOWER UNILAT RIGHT   Final Result      DX-TIBIA AND FIBULA RIGHT   Final Result      Diffuse soft tissue swelling without evidence of bony abnormality.                  INTERPRETING LOCATION:  [address] [place] [address] [place], [address]           Assessment/Plan  * Sepsis (HCC)- (present on admission)  Assessment & Plan  This is Sepsis Present on admission  SIRS criteria identified on my evaluation  include: Tachycardia, with heart rate greater than 90 BPM and Leukocytosis, with WBC greater than 12,000  Source is right leg cellulitis and UTI.  Leukocytosis improving.  Lactic acidosis resolved with IV fluids  -Sepsis protocol initiated  -Patient reassessed at the bedside    Cellulitis of right leg- (present on admission)  Assessment & Plan  -F/u blood cultures  -Continue broad-spectrum IV antibiotics   -Lower extremity ultrasound negative for DVT     Acute cystitis with hematuria- (present on admission)  Assessment & Plan  -Continue with antibiotics  -Follow-up urine culture    Hypokalemia- (present on admission)  Assessment & Plan  Potassium low on admission.  Magnesium level normal at 2.1.    - Replace and monitor    IV drug user- (present on admission)  Assessment & Plan  IV heroin use.  She states she uses new needles and doesn't share.  Last use was the morning of 7/18.  States that she has been using heroin for roughly 14 years.  -Continue education and counseling  -Discussed option of methadone clinics    Paronychia of finger, right- (present on admission)  Assessment & Plan  On antibiotic    Tobacco abuse- (present on admission)  Assessment & Plan  - Cessation encouraged  -Nicotine replacement available, she declined on admission    Methamphetamine abuse (HCC)  Assessment & Plan  U tox positive for amphetamine and opiates.  - Needs ongoing encouragement for cessation       VTE prophylaxis: Lovenox

## 2020-07-19 NOTE — ASSESSMENT & PLAN NOTE
-F/u blood cultures  -Continue broad-spectrum IV antibiotics   -Lower extremity ultrasound negative for DVT

## 2020-07-19 NOTE — ASSESSMENT & PLAN NOTE
This is Sepsis Present on admission  SIRS criteria identified on my evaluation include: Tachycardia, with heart rate greater than 90 BPM and Leukocytosis, with WBC greater than 12,000  Source is right leg cellulitis and UTI.  Leukocytosis improving.  Lactic acidosis resolved with IV fluids  -Sepsis protocol initiated  -Patient reassessed at the bedside

## 2020-07-19 NOTE — ASSESSMENT & PLAN NOTE
IV heroin use.  She states she uses new needles and doesn't share.  Last use was the morning of 7/18.  States that she has been using heroin for roughly 14 years.  -Continue education and counseling  -Discussed option of methadone clinics

## 2020-07-19 NOTE — PROGRESS NOTES
"Pharmacy Kinetics 34 y.o. female on vancomycin day # 1 2020    Received Vancomycin 1,750 mg iv loading dose at 17:37 PM  Provider specified end date: TBD    Indication for Treatment: SSTI R leg     Pertinent history per medical record: Admitted on 2020 for cellulitis of R leg. Pt presented to the ER with RLE swelling x 5 days. She admits to IVDU prior to coming to ER today. She has a leukocytosis, but is afebrile. Her tib/fib imaging showed diffuse soft tissue swelling without evidence of bony abnormality.    Other antibiotics: ampicillin-sulbactam 3g IV Q6h     Allergies: Patient has no known allergies.     List concerns for renal function: BMI 29.05 kg/m^2     Pertinent cultures to date:    Blood culture x 2 - in process     MRSA nares swab if pneumonia is a concern (ordered/positive/negative/n-a): n-a    Recent Labs     20  1548   WBC 25.6*   NEUTSPOLYS 77.50*     Recent Labs     20  1548   BUN 13   CREATININE 0.76   ALBUMIN 3.2      /71   Pulse (!) 108   Temp 36.1 °C (97 °F) (Temporal)   Resp 18   Ht 1.6 m (5' 3\")   Wt 74.4 kg (164 lb)   SpO2 97%  Temp (24hrs), Av.1 °C (97 °F), Min:36.1 °C (97 °F), Max:36.1 °C (97 °F)    A/P   1. Vancomycin dose change: 1,000 mg IV Q8h (02:00, 10:00, 18:00)  2. Next vancomycin level: Trough prior to the 4th or 5th dose (not yet ordered)   3. Goal trough: 10-15 mcg/mL  4. Comments: Vanco ~14 mg/kg IV Q8h initiated for RLE SSTI. Concerns for renal accumulation of vancomycin listed above. A trough will be obtained at steady state. Pharmacy will continue to follow and recommend de-escalation of antibiotics as appropriate.     Odilia Galvez, PharmD, BCPS        "

## 2020-07-19 NOTE — PROGRESS NOTES
"Pharmacy Kinetics 34 y.o. female on vancomycin day # 2 2020    Currently on Vancomycin 1,000 mg IV Q8h (02:00, 10:00, 18:00)    Provider specified end date: TBD     Indication for Treatment: SSTI R leg      Pertinent history per medical record: Admitted on 2020 for cellulitis of R leg. Pt presented to the ER with RLE swelling x 5 days. She admits to IVDU prior to coming to ER today. She has a leukocytosis, but is afebrile. Her tib/fib imaging showed diffuse soft tissue swelling without evidence of bony abnormality.     Other antibiotics: ampicillin-sulbactam 3g IV Q6h, Clindamycin 600 mg q8H     Allergies: Patient has no known allergies.      List concerns for renal function: BMI 29.05 kg/m^2      Pertinent cultures to date:    Blood culture x 2 - NGTD      MRSA nares swab if pneumonia is a concern (ordered/positive/negative/n-a): n-a        Recent Labs     20  1548 20  2355   WBC 25.6* 22.6*   NEUTSPOLYS 77.50* 76.90*     Recent Labs     20  1548 20  2355   BUN 13 9   CREATININE 0.76 0.46*   ALBUMIN 3.2  --      No results for input(s): VANCOTROUGH, VANCOPEAK, VANCORANDOM in the last 72 hours.    Intake/Output Summary (Last 24 hours) at 2020 0938  Last data filed at 2020 0754  Gross per 24 hour   Intake 1340 ml   Output 0 ml   Net 1340 ml      BP (!) 95/55   Pulse 91   Temp 36.6 °C (97.9 °F) (Temporal)   Resp 17   Ht 1.6 m (5' 3\")   Wt 74.4 kg (164 lb)   SpO2 99%  Temp (24hrs), Av.9 °C (98.5 °F), Min:36.1 °C (97 °F), Max:37.7 °C (99.8 °F)      A/P   1. Vancomycin dose change: Not indicated at this time- continue with vancomycin 1,000 mg IV Q8h (02:00, 10:00, 18:00)  2. Next vancomycin level: 20 at 0130 (prior to 4th dose- ORDERED)  3. Goal trough: 10-15 mcg/mL  4. Comments: No h/o vancomycin usage in Epic. Empiric antibiotics for possible nec fasc r/o.  WBC tending down. Daily CMP ordered to monitor renal function while on vancomycin. BC remain NGTD. " Will obtain trough in AM to ensure adequate clearance of vancomycin. Pharmacy will continue to monitor.    Meche Bill, PharmD

## 2020-07-19 NOTE — H&P
Hospital Medicine History & Physical Note    Date of Service  2020    Primary Care Physician  No primary care provider on file.    Code Status  Full Code    Chief Complaint  Chief Complaint   Patient presents with   • Leg Swelling   • Wound Check   • Urinating Blood       History of Presenting Illness  34 y.o. female IV drug abuser of meth and heroin (last used hours prior to ER visit today) who presented 2020 with right leg swelling and pain x 5 days.  States unable to stand to put pressure on her leg over last several days. Denies any hx of dvt personally nor in her family.  She did state having nicked her skin while shaving her leg days prior to having her leg swell and become red.  She denies any sick contacts.  She has no F/C/N/V/D.  She does state some discomfort urinating and questions if she had blood in her urine today.    Review of Systems  Review of Systems   Constitutional: Positive for malaise/fatigue. Negative for chills and fever.   HENT: Negative for sore throat.    Eyes: Negative for discharge.   Respiratory: Negative for cough, shortness of breath and stridor.    Cardiovascular: Positive for leg swelling. Negative for chest pain and palpitations.   Gastrointestinal: Negative for abdominal pain, diarrhea, nausea and vomiting.   Genitourinary: Negative for dysuria and hematuria.   Musculoskeletal: Positive for myalgias. Negative for back pain and joint pain.   Skin: Negative for rash.   Neurological: Negative for dizziness, speech change and headaches.   Psychiatric/Behavioral: Positive for substance abuse. The patient is not nervous/anxious.        Past Medical History  Heroin abuse w/ IV drug injection    Surgical History   has a past surgical history that includes gyn surgery.  x2    Family History  Father  age 37yo due to sarcoidosis  Mother alive but has hypertension    Social History   reports that she has been smoking cigarettes. She has been smoking about 0.50 packs per  day. She has never used smokeless tobacco. She reports previous alcohol use. She reports current drug use. Drug: Injected (Skin Popping). and has had two kids.    Allergies  No Known Allergies    Medications  Prior to Admission Medications   Prescriptions Last Dose Informant Patient Reported? Taking?   levonorgestrel (MIRENA, 52 MG,) 52 mg (20 mcg/24 hr) IUD  at AM Patient Yes Yes   Si Each by Intrauterine route Once.      Facility-Administered Medications: None       Physical Exam  Temp:  [36.1 °C (97 °F)] 36.1 °C (97 °F)  Pulse:  [108-121] 108  Resp:  [18] 18  BP: (104)/(71) 104/71  SpO2:  [96 %-97 %] 97 %    Physical Exam  Vitals signs reviewed.   Constitutional:       Appearance: Normal appearance. She is not diaphoretic.   HENT:      Head: Normocephalic and atraumatic.      Nose: Nose normal.      Mouth/Throat:      Mouth: Mucous membranes are moist.      Pharynx: No oropharyngeal exudate.   Eyes:      General: No scleral icterus.        Right eye: No discharge.         Left eye: No discharge.      Extraocular Movements: Extraocular movements intact.      Conjunctiva/sclera: Conjunctivae normal.      Pupils: Pupils are equal, round, and reactive to light.   Neck:      Musculoskeletal: No muscular tenderness.      Vascular: No carotid bruit.   Cardiovascular:      Rate and Rhythm: Normal rate and regular rhythm.      Pulses:           Radial pulses are 2+ on the right side and 2+ on the left side.        Dorsalis pedis pulses are 2+ on the right side and 2+ on the left side.      Heart sounds: No murmur.   Pulmonary:      Effort: Pulmonary effort is normal. No respiratory distress.      Breath sounds: Normal breath sounds. No wheezing or rales.   Abdominal:      General: Bowel sounds are normal. There is no distension.      Palpations: Abdomen is soft.   Musculoskeletal:         General: Swelling present. No tenderness.      Right lower leg: Edema present.   Lymphadenopathy:      Cervical: No  cervical adenopathy.   Skin:     Coloration: Skin is not jaundiced or pale.      Findings: Erythema (right lower leg) present.   Neurological:      General: No focal deficit present.      Mental Status: She is alert and oriented to person, place, and time. Mental status is at baseline.      Cranial Nerves: No cranial nerve deficit.   Psychiatric:         Mood and Affect: Mood normal.         Behavior: Behavior normal.         Laboratory:  Recent Labs     07/18/20  1548   WBC 25.6*   RBC 4.47   HEMOGLOBIN 11.6*   HEMATOCRIT 37.9   MCV 84.8   MCH 26.0*   MCHC 30.6*   RDW 49.8   PLATELETCT 434   MPV 10.7     Recent Labs     07/18/20  1548   SODIUM 136   POTASSIUM 3.1*   CHLORIDE 95*   CO2 26   GLUCOSE 113*   BUN 13   CREATININE 0.76   CALCIUM 9.1     Recent Labs     07/18/20  1548   ALTSGPT 38   ASTSGOT 45   ALKPHOSPHAT 52   TBILIRUBIN 0.6   GLUCOSE 113*     Recent Labs     07/18/20  1548   APTT 29.1   INR 1.09     No results for input(s): NTPROBNP in the last 72 hours.      No results for input(s): TROPONINT in the last 72 hours.    Imaging:  DX-TIBIA AND FIBULA RIGHT   Final Result      Diffuse soft tissue swelling without evidence of bony abnormality.                  INTERPRETING LOCATION:  [address] [facility], [facility], [address]      US-EXTREMITY VENOUS LOWER UNILAT RIGHT    (Results Pending)         Assessment/Plan:    * Sepsis (HCC)  Assessment & Plan  This is Sepsis Present on admission  SIRS criteria identified on my evaluation include: Tachycardia, with heart rate greater than 90 BPM and Leukocytosis, with WBC greater than 12,000  Source is right leg cellulitis  Sepsis protocol initiated  Fluid resuscitation ordered per protocol  IV antibiotics as appropriate for source of sepsis  While organ dysfunction may be noted elsewhere in this problem list or in the chart, degree of organ dysfunction does not meet CMS criteria for severe sepsis    Cellulitis of right leg  Assessment & Plan  F/u on blood  cultures  Antibiotics  R/o necrotizing fasciitis  Await LE U/S    Hypokalemia  Assessment & Plan  Replace and monitor  Check Mg    Methamphetamine abuse (HCC)  Assessment & Plan  Needs ongoing encouragement for cessation    Heroin abuse (HCC)  Assessment & Plan  Pain medications.  Watch for withdrawal.    Tobacco abuse  Assessment & Plan  Cessation encouraged  Nicotine patch or gum only if requested    IV drug user  Assessment & Plan  States she uses clean new needles and doesn't share needles  Heroin use x 14 years.

## 2020-07-20 ENCOUNTER — PATIENT OUTREACH (OUTPATIENT)
Dept: HEALTH INFORMATION MANAGEMENT | Facility: OTHER | Age: 35
End: 2020-07-20

## 2020-07-20 VITALS
HEIGHT: 63 IN | SYSTOLIC BLOOD PRESSURE: 108 MMHG | OXYGEN SATURATION: 95 % | HEART RATE: 89 BPM | RESPIRATION RATE: 17 BRPM | DIASTOLIC BLOOD PRESSURE: 69 MMHG | BODY MASS INDEX: 29.06 KG/M2 | WEIGHT: 164 LBS | TEMPERATURE: 98.8 F

## 2020-07-20 PROBLEM — E87.6 HYPOKALEMIA: Status: RESOLVED | Noted: 2020-07-18 | Resolved: 2020-07-20

## 2020-07-20 PROBLEM — A41.9 SEPSIS (HCC): Status: RESOLVED | Noted: 2020-07-18 | Resolved: 2020-07-20

## 2020-07-20 PROBLEM — N30.01 ACUTE CYSTITIS WITH HEMATURIA: Status: RESOLVED | Noted: 2020-07-19 | Resolved: 2020-07-20

## 2020-07-20 LAB
ALBUMIN SERPL BCP-MCNC: 2.1 G/DL (ref 3.2–4.9)
ALBUMIN/GLOB SERPL: 0.5 G/DL
ALP SERPL-CCNC: 36 U/L (ref 30–99)
ALT SERPL-CCNC: 27 U/L (ref 2–50)
ANION GAP SERPL CALC-SCNC: 8 MMOL/L (ref 7–16)
AST SERPL-CCNC: 35 U/L (ref 12–45)
BILIRUB SERPL-MCNC: 0.3 MG/DL (ref 0.1–1.5)
BUN SERPL-MCNC: 8 MG/DL (ref 8–22)
CALCIUM SERPL-MCNC: 7.7 MG/DL (ref 8.5–10.5)
CHLORIDE SERPL-SCNC: 101 MMOL/L (ref 96–112)
CO2 SERPL-SCNC: 25 MMOL/L (ref 20–33)
CREAT SERPL-MCNC: 0.37 MG/DL (ref 0.5–1.4)
GLOBULIN SER CALC-MCNC: 4.5 G/DL (ref 1.9–3.5)
GLUCOSE SERPL-MCNC: 86 MG/DL (ref 65–99)
POTASSIUM SERPL-SCNC: 3.4 MMOL/L (ref 3.6–5.5)
PROT SERPL-MCNC: 6.6 G/DL (ref 6–8.2)
SARS-COV-2 RNA RESP QL NAA+PROBE: NOTDETECTED
SODIUM SERPL-SCNC: 134 MMOL/L (ref 135–145)
SPECIMEN SOURCE: NORMAL
VANCOMYCIN TROUGH SERPL-MCNC: 9.8 UG/ML (ref 10–20)

## 2020-07-20 PROCEDURE — 700102 HCHG RX REV CODE 250 W/ 637 OVERRIDE(OP): Performed by: HOSPITALIST

## 2020-07-20 PROCEDURE — 700111 HCHG RX REV CODE 636 W/ 250 OVERRIDE (IP): Performed by: HOSPITALIST

## 2020-07-20 PROCEDURE — A9270 NON-COVERED ITEM OR SERVICE: HCPCS | Performed by: HOSPITALIST

## 2020-07-20 PROCEDURE — 80053 COMPREHEN METABOLIC PANEL: CPT

## 2020-07-20 PROCEDURE — 99239 HOSP IP/OBS DSCHRG MGMT >30: CPT | Performed by: HOSPITALIST

## 2020-07-20 PROCEDURE — 700105 HCHG RX REV CODE 258: Performed by: HOSPITALIST

## 2020-07-20 PROCEDURE — 700101 HCHG RX REV CODE 250: Performed by: HOSPITALIST

## 2020-07-20 PROCEDURE — 80202 ASSAY OF VANCOMYCIN: CPT

## 2020-07-20 PROCEDURE — 36415 COLL VENOUS BLD VENIPUNCTURE: CPT

## 2020-07-20 RX ORDER — SULFAMETHOXAZOLE AND TRIMETHOPRIM 800; 160 MG/1; MG/1
1 TABLET ORAL EVERY 12 HOURS
Status: DISCONTINUED | OUTPATIENT
Start: 2020-07-20 | End: 2020-07-20 | Stop reason: HOSPADM

## 2020-07-20 RX ORDER — SULFAMETHOXAZOLE AND TRIMETHOPRIM 800; 160 MG/1; MG/1
1 TABLET ORAL EVERY 12 HOURS
Qty: 10 TAB | Refills: 0 | Status: SHIPPED | OUTPATIENT
Start: 2020-07-20 | End: 2020-07-20

## 2020-07-20 RX ORDER — AMOXICILLIN AND CLAVULANATE POTASSIUM 875; 125 MG/1; MG/1
1 TABLET, FILM COATED ORAL EVERY 12 HOURS
Qty: 10 TAB | Refills: 0 | Status: SHIPPED | OUTPATIENT
Start: 2020-07-20 | End: 2020-07-20

## 2020-07-20 RX ORDER — AMOXICILLIN AND CLAVULANATE POTASSIUM 875; 125 MG/1; MG/1
1 TABLET, FILM COATED ORAL EVERY 12 HOURS
Status: DISCONTINUED | OUTPATIENT
Start: 2020-07-20 | End: 2020-07-20 | Stop reason: HOSPADM

## 2020-07-20 RX ORDER — CEPHALEXIN 500 MG/1
500 CAPSULE ORAL 4 TIMES DAILY
Qty: 12 CAP | Refills: 0 | Status: SHIPPED | OUTPATIENT
Start: 2020-07-20 | End: 2020-07-23

## 2020-07-20 RX ORDER — POTASSIUM CHLORIDE 20 MEQ/1
20 TABLET, EXTENDED RELEASE ORAL ONCE
Status: COMPLETED | OUTPATIENT
Start: 2020-07-20 | End: 2020-07-20

## 2020-07-20 RX ADMIN — SODIUM CHLORIDE 3 G: 900 INJECTION INTRAVENOUS at 01:38

## 2020-07-20 RX ADMIN — ACETAMINOPHEN 650 MG: 325 TABLET, FILM COATED ORAL at 17:27

## 2020-07-20 RX ADMIN — DOCUSATE SODIUM 50 MG AND SENNOSIDES 8.6 MG 2 TABLET: 8.6; 5 TABLET, FILM COATED ORAL at 05:39

## 2020-07-20 RX ADMIN — SULFAMETHOXAZOLE AND TRIMETHOPRIM 1 TABLET: 800; 160 TABLET ORAL at 11:05

## 2020-07-20 RX ADMIN — POTASSIUM CHLORIDE AND SODIUM CHLORIDE: 900; 150 INJECTION, SOLUTION INTRAVENOUS at 06:02

## 2020-07-20 RX ADMIN — CLINDAMYCIN IN 5 PERCENT DEXTROSE 600 MG: 12 INJECTION, SOLUTION INTRAVENOUS at 05:40

## 2020-07-20 RX ADMIN — SODIUM CHLORIDE 3 G: 900 INJECTION INTRAVENOUS at 09:11

## 2020-07-20 RX ADMIN — POLYETHYLENE GLYCOL 3350 1 PACKET: 17 POWDER, FOR SOLUTION ORAL at 05:47

## 2020-07-20 RX ADMIN — OXYCODONE HYDROCHLORIDE 10 MG: 10 TABLET ORAL at 09:24

## 2020-07-20 RX ADMIN — AMOXICILLIN AND CLAVULANATE POTASSIUM 1 TABLET: 875; 125 TABLET, FILM COATED ORAL at 11:05

## 2020-07-20 RX ADMIN — MORPHINE SULFATE 4 MG: 4 INJECTION INTRAVENOUS at 04:07

## 2020-07-20 RX ADMIN — OXYCODONE HYDROCHLORIDE 10 MG: 10 TABLET ORAL at 14:52

## 2020-07-20 RX ADMIN — VANCOMYCIN HYDROCHLORIDE 1000 MG: 500 INJECTION, POWDER, LYOPHILIZED, FOR SOLUTION INTRAVENOUS at 02:25

## 2020-07-20 RX ADMIN — OXYCODONE HYDROCHLORIDE 10 MG: 10 TABLET ORAL at 05:39

## 2020-07-20 RX ADMIN — ENOXAPARIN SODIUM 40 MG: 40 INJECTION SUBCUTANEOUS at 05:40

## 2020-07-20 RX ADMIN — POTASSIUM CHLORIDE 20 MEQ: 1500 TABLET, EXTENDED RELEASE ORAL at 09:11

## 2020-07-20 RX ADMIN — OXYCODONE HYDROCHLORIDE 10 MG: 10 TABLET ORAL at 02:24

## 2020-07-20 NOTE — DISCHARGE INSTRUCTIONS
Discharge Instructions    Discharged to home by car with friend. Discharged via wheelchair, hospital escort: Yes.  Special equipment needed: Not Applicable    Be sure to schedule a follow-up appointment with your primary care doctor or any specialists as instructed.     Discharge Plan:     Discharge Instructions per Felicitas Odonnell M.D.    Please establish with a PCP.    ACTIVITY: as tolerated    DIAGNOSIS: right LE cellulitis    Return to ER if you develop new or worsening symptoms.      Cellulitis, Adult    Cellulitis is a skin infection. The infected area is often warm, red, swollen, and sore. It occurs most often in the arms and lower legs. It is very important to get treated for this condition.  What are the causes?  This condition is caused by bacteria. The bacteria enter through a break in the skin, such as a cut, burn, insect bite, open sore, or crack.  What increases the risk?  This condition is more likely to occur in people who:  · Have a weak body defense system (immune system).  · Have open cuts, burns, bites, or scrapes on the skin.  · Are older than 60 years of age.  · Have a blood sugar problem (diabetes).  · Have a long-lasting (chronic) liver disease (cirrhosis) or kidney disease.  · Are very overweight (obese).  · Have a skin problem, such as:  ? Itchy rash (eczema).  ? Slow movement of blood in the veins (venous stasis).  ? Fluid buildup below the skin (edema).  · Have been treated with high-energy rays (radiation).  · Use IV drugs.  What are the signs or symptoms?  Symptoms of this condition include:  · Skin that is:  ? Red.  ? Streaking.  ? Spotting.  ? Swollen.  ? Sore or painful when you touch it.  ? Warm.  · A fever.  · Chills.  · Blisters.  How is this diagnosed?  This condition is diagnosed based on:  · Medical history.  · Physical exam.  · Blood tests.  · Imaging tests.  How is this treated?  Treatment for this condition may include:  · Medicines to treat infections or  allergies.  · Home care, such as:  ? Rest.  ? Placing cold or warm cloths (compresses) on the skin.  · Hospital care, if the condition is very bad.  Follow these instructions at home:  Medicines  · Take over-the-counter and prescription medicines only as told by your doctor.  · If you were prescribed an antibiotic medicine, take it as told by your doctor. Do not stop taking it even if you start to feel better.  General instructions    · Drink enough fluid to keep your pee (urine) pale yellow.  · Do not touch or rub the infected area.  · Raise (elevate) the infected area above the level of your heart while you are sitting or lying down.  · Place cold or warm cloths on the area as told by your doctor.  · Keep all follow-up visits as told by your doctor. This is important.  Contact a doctor if:  · You have a fever.  · You do not start to get better after 1-2 days of treatment.  · Your bone or joint under the infected area starts to hurt after the skin has healed.  · Your infection comes back. This can happen in the same area or another area.  · You have a swollen bump in the area.  · You have new symptoms.  · You feel ill and have muscle aches and pains.  Get help right away if:  · Your symptoms get worse.  · You feel very sleepy.  · You throw up (vomit) or have watery poop (diarrhea) for a long time.  · You see red streaks coming from the area.  · Your red area gets larger.  · Your red area turns dark in color.  These symptoms may represent a serious problem that is an emergency. Do not wait to see if the symptoms will go away. Get medical help right away. Call your local emergency services (911 in the [place] Do not drive yourself to the hospital.  Summary  · Cellulitis is a skin infection. The area is often warm, red, swollen, and sore.  · This condition is treated with medicines, rest, and cold and warm cloths.  · Take all medicines only as told by your doctor.  · Tell your doctor if symptoms do not start to get  better after 1-2 days of treatment.  This information is not intended to replace advice given to you by your health care provider. Make sure you discuss any questions you have with your health care provider.  Document Released: 06/05/2009 Document Revised: 05/09/2019 Document Reviewed: 05/09/2019  [employer] Patient Education © 2020 [employer]  Diet Plan: Discussed  Activity Level: Discussed  Smoking Cessation Offered: Patient Refused  Confirmed Follow up Appointment: Patient to Call and Schedule Appointment  Confirmed Symptoms Management: Discussed  Medication Reconciliation Updated: Yes    I understand that a diet low in cholesterol, fat, and sodium is recommended for good health. Unless I have been given specific instructions below for another diet, I accept this instruction as my diet prescription.   Other diet: Regular    Special Instructions: None    · Is patient discharged on Warfarin / Coumadin?   No     Depression / Suicide Risk    As you are discharged from this [facility] facility, it is important to learn how to keep safe from harming yourself.    Recognize the warning signs:  · Abrupt changes in personality, positive or negative- including increase in energy   · Giving away possessions  · Change in eating patterns- significant weight changes-  positive or negative  · Change in sleeping patterns- unable to sleep or sleeping all the time   · Unwillingness or inability to communicate  · Depression  · Unusual sadness, discouragement and loneliness  · Talk of wanting to die  · Neglect of personal appearance   · Rebelliousness- reckless behavior  · Withdrawal from people/activities they love  · Confusion- inability to concentrate     If you or a loved one observes any of these behaviors or has concerns about self-harm, here's what you can do:  · Talk about it- your feelings and reasons for harming yourself  · Remove any means that you might use to hurt yourself (examples: pills, rope, extension cords,  firearm)  · Get professional help from the community (Mental Health, Substance Abuse, psychological counseling)  · Do not be alone:Call your Safe Contact- someone whom you trust who will be there for you.  · Call your local CRISIS HOTLINE 743-3241 or 585-177-4645  · Call your local Children's Mobile Crisis Response Team [place] (864) 045-0370 or www.[employer]  · Call the toll free National Suicide Prevention Hotlines   · National Suicide Prevention Lifeline 937-870-SNKC (0188)  · National Hope Line Network 800-SUICIDE (893-4867)

## 2020-07-20 NOTE — PROGRESS NOTES
1906 Received report from day RN MEAGAN Desir discussed. Call light in place, bed lowered and locked. Encourage pt to call if needed anything. Patient A&Ox4, RA, swollen RLE with blisters and scabbed wound.

## 2020-07-20 NOTE — DISCHARGE SUMMARY
Discharge Summary    CHIEF COMPLAINT ON ADMISSION  Chief Complaint   Patient presents with   • Leg Swelling   • Wound Check   • Urinating Blood       Reason for Admission  Foot Pain     Admission Date  7/18/2020    CODE STATUS  Full Code    HPI & HOSPITAL COURSE  This is a 34 y.o. female IV drug user here with right lower extremity swelling and pain x5 days.  She states that she was unable to stand or put pressure on her right leg for the last several days.  She reports that she may have nicked her skin while shaving and then it became red and swollen.  She denied any other concerns or symptoms aside from mild dysuria and blood in her urine.  She was started on empiric antibiotics.  Lower extremity Doppler was negative for DVT.  Swelling improved and erythema receded from the original outline.  Given that this is likely a staph cellulitis antibiotics were changed to Keflex.  She felt significantly improved and was eager for discharge.       Therefore, she is discharged in good and stable condition to home with close outpatient follow-up.    The patient met 2-midnight criteria for an inpatient stay at the time of discharge.    Discharge Date  7/20/2020    FOLLOW UP ITEMS POST DISCHARGE  Please establish with a primary care physician.    DISCHARGE DIAGNOSES  Principal Problem (Resolved):    Sepsis (HCC) POA: Yes  Active Problems:    Cellulitis of right leg POA: Yes    IV drug user POA: Yes    Methamphetamine abuse (HCC) POA: Unknown    Tobacco abuse POA: Yes    Paronychia of finger, right POA: Yes  Resolved Problems:    Hypokalemia POA: Yes    Acute cystitis with hematuria POA: Yes      FOLLOW UP  No future appointments.  [address] [address] [phone]  Schedule an appointment as soon as possible for a visit  Please call to establish with a Primary Care Physicain and schedule your hospital follow up. Thank you.      MEDICATIONS ON DISCHARGE     Medication List       START taking these medications      Instructions   cephALEXin 500 MG Caps  Commonly known as:  KEFLEX   Take 1 Cap by mouth 4 times a day for 3 days.  Dose:  500 mg        CONTINUE taking these medications      Instructions   Mirena (52 MG) 52 mg (20 mcg/24 hr) IUD  Generic drug:  levonorgestrel   1 Each by Intrauterine route Once.  Dose:  1 Each            Allergies  No Known Allergies    DIET  Orders Placed This Encounter   Procedures   • Diet Order Regular     Standing Status:   Standing     Number of Occurrences:   1     Order Specific Question:   Diet:     Answer:   Regular [1]       ACTIVITY  As tolerated.  Weight bearing as tolerated    CONSULTATIONS  None    PROCEDURES  US-EXTREMITY VENOUS LOWER UNILAT RIGHT   Final Result      DX-TIBIA AND FIBULA RIGHT   Final Result      Diffuse soft tissue swelling without evidence of bony abnormality.                  INTERPRETING LOCATION:  [address] [place] [address], [id number]            LABORATORY  Lab Results   Component Value Date    SODIUM 134 (L) 07/20/2020    POTASSIUM 3.4 (L) 07/20/2020    CHLORIDE 101 07/20/2020    CO2 25 07/20/2020    GLUCOSE 86 07/20/2020    BUN 8 07/20/2020    CREATININE 0.37 (L) 07/20/2020        Lab Results   Component Value Date    WBC 21.2 (H) 07/19/2020    HEMOGLOBIN 10.0 (L) 07/19/2020    HEMATOCRIT 32.9 (L) 07/19/2020    PLATELETCT 368 07/19/2020        Total time of the discharge process exceeds 41 minutes.

## 2020-07-21 ENCOUNTER — DOCUMENTATION (OUTPATIENT)
Dept: HEALTH INFORMATION MANAGEMENT | Facility: OTHER | Age: 35
End: 2020-07-21

## 2020-07-21 LAB
BACTERIA UR CULT: NORMAL
SIGNIFICANT IND 70042: NORMAL
SITE SITE: NORMAL
SOURCE SOURCE: NORMAL

## 2020-07-23 LAB
BACTERIA BLD CULT: NORMAL
BACTERIA BLD CULT: NORMAL
SIGNIFICANT IND 70042: NORMAL
SIGNIFICANT IND 70042: NORMAL
SITE SITE: NORMAL
SITE SITE: NORMAL
SOURCE SOURCE: NORMAL
SOURCE SOURCE: NORMAL

## 2021-04-27 ENCOUNTER — HOSPITAL ENCOUNTER (OUTPATIENT)
Dept: LAB | Facility: MEDICAL CENTER | Age: 36
End: 2021-04-27
Payer: COMMERCIAL

## 2021-04-27 LAB
COVID ORDER STATUS COVID19: NORMAL
SARS-COV-2 RNA RESP QL NAA+PROBE: NOTDETECTED
SPECIMEN SOURCE: NORMAL

## 2022-10-15 ENCOUNTER — HOSPITAL ENCOUNTER (EMERGENCY)
Facility: MEDICAL CENTER | Age: 37
End: 2022-10-15
Attending: EMERGENCY MEDICINE
Payer: MEDICAID

## 2022-10-15 VITALS
OXYGEN SATURATION: 98 % | SYSTOLIC BLOOD PRESSURE: 124 MMHG | TEMPERATURE: 97 F | BODY MASS INDEX: 40.23 KG/M2 | HEIGHT: 63 IN | DIASTOLIC BLOOD PRESSURE: 80 MMHG | WEIGHT: 227.07 LBS | RESPIRATION RATE: 16 BRPM | HEART RATE: 112 BPM

## 2022-10-15 DIAGNOSIS — Z03.818 ENCOUNTER FOR PATIENT CONCERN ABOUT EXPOSURE TO INFECTIOUS ORGANISM: ICD-10-CM

## 2022-10-15 DIAGNOSIS — R21 RASH: ICD-10-CM

## 2022-10-15 DIAGNOSIS — F15.10 METHAMPHETAMINE ABUSE (HCC): ICD-10-CM

## 2022-10-15 DIAGNOSIS — L50.9 URTICARIA: Primary | ICD-10-CM

## 2022-10-15 LAB
C TRACH DNA SPEC QL NAA+PROBE: NEGATIVE
N GONORRHOEA DNA SPEC QL NAA+PROBE: NEGATIVE
SPECIMEN SOURCE: NORMAL

## 2022-10-15 PROCEDURE — 99283 EMERGENCY DEPT VISIT LOW MDM: CPT

## 2022-10-15 PROCEDURE — 87491 CHLMYD TRACH DNA AMP PROBE: CPT

## 2022-10-15 PROCEDURE — 87591 N.GONORRHOEAE DNA AMP PROB: CPT

## 2022-10-15 PROCEDURE — 700102 HCHG RX REV CODE 250 W/ 637 OVERRIDE(OP): Performed by: EMERGENCY MEDICINE

## 2022-10-15 PROCEDURE — A9270 NON-COVERED ITEM OR SERVICE: HCPCS | Performed by: EMERGENCY MEDICINE

## 2022-10-15 RX ORDER — BENZOCAINE/MENTHOL 6 MG-10 MG
LOZENGE MUCOUS MEMBRANE ONCE
Status: COMPLETED | OUTPATIENT
Start: 2022-10-15 | End: 2022-10-15

## 2022-10-15 RX ORDER — DIPHENHYDRAMINE HCL 25 MG
50 TABLET ORAL ONCE
Status: COMPLETED | OUTPATIENT
Start: 2022-10-15 | End: 2022-10-15

## 2022-10-15 RX ADMIN — DIPHENHYDRAMINE HYDROCHLORIDE 50 MG: 25 TABLET ORAL at 00:48

## 2022-10-15 RX ADMIN — HYDROCORTISONE: 10 CREAM TOPICAL at 01:31

## 2022-10-15 NOTE — ED PROVIDER NOTES
"ED Provider Note    Means of Arrival: Walk-in  History obtained from: patient    CHIEF COMPLAINT  Chief Complaint   Patient presents with    Rash     Rash to scalp x3 days; pt states it is very itchy and she has broken skin and bleeding spots from scratching it       HPI  Jazmin Tong is a 37 y.o. female who presents ER and then started itching between her ellie.  She also endorses heroin and methamphetamine use today.  She denies known allergies, possible infestation, fevers, chills, headache, eczema, or rash anywhere on her body.  Patient denies ever having symptoms like this before.  She denies pain but complains of continuous itching.  Patient also would like to be screened for STI.  She denies vaginal discharge, vaginal burning, dysuria, abdominal pain, nausea, or vomiting.    REVIEW OF SYSTEMS  See HPI for further details.       PAST MEDICAL HISTORY  History reviewed. No pertinent past medical history.    FAMILY HISTORY  History reviewed. No pertinent family history.    SOCIAL HISTORY   reports that she has been smoking cigarettes. She has been smoking an average of .5 packs per day. She has never used smokeless tobacco. She reports current alcohol use. She reports current drug use. Drug: Inhaled.    SURGICAL HISTORY  Past Surgical History:   Procedure Laterality Date    GYN SURGERY       x2       CURRENT MEDICATIONS  Home Medications       Reviewed by Jazmin Arboleda R.N. (Registered Nurse) on 10/14/22 at 9084  Med List Status: Not Addressed     Medication Last Dose Status   levonorgestrel (MIRENA, 52 MG,) 52 mg (20 mcg/24 hr) IUD  Active                    ALLERGIES  No Known Allergies    PHYSICAL EXAM  VITAL SIGNS: /85   Pulse (!) 119   Temp 36 °C (96.8 °F) (Temporal)   Resp 18   Ht 1.6 m (5' 3\")   Wt 103 kg (227 lb 1.2 oz)   SpO2 97% Comment: Room air  BMI 40.22 kg/m²    Gen: Alert no acute distress  HENT: ATNC, patient is scratching in between her ellie with scissors and " constantly scratching in between with her fingers.  Skin is dry, there is excoriations but no deep lacerations.  No erythema, crepitus, drainage, lesions, or vesicles  Eyes: Normal conjunctiva  Neck: trachea midline  Resp: no respiratory distress  CV: No JVD, RRR  Abd: non-distended  Ext: No deformities  Psych: normal mood  Neuro: speech fluent       RADIOLOGY/PROCEDURES  No orders to display       LABS  Labs Reviewed   CHLAMYDIA/GC, PCR (URINE)        EKG  No results found for this or any previous visit.     COURSE & MEDICAL DECISION MAKING  Pertinent Labs & Imaging studies reviewed. (See chart for details)    Jazmin Tong is a 37 y.o. female who presents ER and then started itching between her ellie.  Differential includes: Infestation, allergy, eczema, methamphetamine abuse, scabies, contact dermatitis, dandruff.  Patient is afebrile, vital signs reassuring.  Exam as above with patient constantly itching between her ellie.  Skin is dry, flaky, and with superficial excoriations.  Patient is given steroid cream and Benadryl.  Gonorrhea chlamydia urine is ordered.  Patient has no  complaints, however, has concern for potential STI exposure.  She is educated on home care for her skin rash.  Encouraged to stop using methamphetamines.  Given primary care follow-up as needed and encouraged to use safe sex practices.  She is given strict ER return precautions and discharged in good condition.    Appropriate PPE were worn at this encounter.     FINAL IMPRESSION  1. Urticaria Acute   2. Rash Acute   3. Methamphetamine abuse (HCC) Acute   4. Encounter for patient concern about exposure to infectious organism Acute   The patient will return for new or worsening symptoms and is stable at the time of discharge.    The patient is referred to a primary physician for blood pressure management, diabetic screening, and for all other preventative health concerns.      DISPOSITION:  Patient will be discharged home in stable  condition.    FOLLOW UP:  [facility]  [address] [address]  [phone]    follow up with a primary care provider if your symptoms do not improve in 5-7 days    [facility]  [address] [facility] [address]  [phone]        [facility], Emergency Dept  [address] [facility] [id number] [phone]    As needed, If symptoms worsen      OUTPATIENT MEDICATIONS:  Discharge Medication List as of 10/15/2022  1:37 AM            This dictation was created using voice recognition software. The accuracy of the dictation is limited to the abilities of the software. I expect there may be some errors of grammar and possibly content. The nursing notes were reviewed and certain aspects of this information were incorporated into this note.

## 2022-10-15 NOTE — ED TRIAGE NOTES
Chief Complaint   Patient presents with    Rash     Rash to scalp x3 days; pt states it is very itchy and she has broken skin and bleeding spots from scratching it     Pt ambulatory to triage for above complaint. Pt appears restless in triage and is continually itching her head with her hands and even with scissors from her bag. Pt states she started using a new edge control cream in her hair about a week ago when she got it braided. Pt endorses using smoking meth and heroin, last use today.  Other than being restless pt is pleasant and cooperative.    Pt is alert/oriented and follows commands. Pt speaking in full sentences and responds appropriately to questions. No acute distress noted in triage and respirations are even and unlabored.     Pt placed in lobby and educated on triage process. Pt encouraged to alert staff for any changes in condition.

## 2022-10-15 NOTE — DISCHARGE INSTRUCTIONS
Avoid methamphetamine use as this can make the skin itching worse.  Use Benadryl as needed for itching and you may purchase an over-the-counter steroid cream to use sparingly over the scalp.  Stop using your new hair gel, as this could be causing the rash.  Do not scratch your skin with sharp objects.  Cold compresses may help with symptoms and applying a gentle moisturizer without alcohols or scents.

## 2022-10-15 NOTE — ED NOTES
Pt ambulated to room with steady gait. Placed on monitor. Call light in reach. Provided blankets for comfort. Up for ERP to see.

## 2023-01-20 ENCOUNTER — APPOINTMENT (OUTPATIENT)
Dept: RADIOLOGY | Facility: MEDICAL CENTER | Age: 38
End: 2023-01-20
Attending: STUDENT IN AN ORGANIZED HEALTH CARE EDUCATION/TRAINING PROGRAM
Payer: MEDICAID

## 2023-01-20 ENCOUNTER — HOSPITAL ENCOUNTER (EMERGENCY)
Facility: MEDICAL CENTER | Age: 38
End: 2023-01-20
Attending: STUDENT IN AN ORGANIZED HEALTH CARE EDUCATION/TRAINING PROGRAM
Payer: MEDICAID

## 2023-01-20 VITALS
BODY MASS INDEX: 41.91 KG/M2 | HEIGHT: 62 IN | SYSTOLIC BLOOD PRESSURE: 101 MMHG | RESPIRATION RATE: 18 BRPM | TEMPERATURE: 96.8 F | HEART RATE: 98 BPM | OXYGEN SATURATION: 98 % | DIASTOLIC BLOOD PRESSURE: 55 MMHG | WEIGHT: 227.74 LBS

## 2023-01-20 DIAGNOSIS — M54.50 ACUTE MIDLINE LOW BACK PAIN WITHOUT SCIATICA: ICD-10-CM

## 2023-01-20 DIAGNOSIS — L20.82 FLEXURAL ECZEMA: ICD-10-CM

## 2023-01-20 DIAGNOSIS — Z20.7 SCABIES EXPOSURE: ICD-10-CM

## 2023-01-20 LAB
BACTERIA GENITAL QL WET PREP: NORMAL
HCG UR QL: NEGATIVE
HIV 1+2 AB+HIV1 P24 AG SERPL QL IA: NORMAL
SIGNIFICANT IND 70042: NORMAL
SITE SITE: NORMAL
SOURCE SOURCE: NORMAL
T PALLIDUM AB SER QL IA: NORMAL

## 2023-01-20 PROCEDURE — 87591 N.GONORRHOEAE DNA AMP PROB: CPT

## 2023-01-20 PROCEDURE — 86780 TREPONEMA PALLIDUM: CPT

## 2023-01-20 PROCEDURE — 87389 HIV-1 AG W/HIV-1&-2 AB AG IA: CPT

## 2023-01-20 PROCEDURE — 700101 HCHG RX REV CODE 250: Performed by: STUDENT IN AN ORGANIZED HEALTH CARE EDUCATION/TRAINING PROGRAM

## 2023-01-20 PROCEDURE — 700102 HCHG RX REV CODE 250 W/ 637 OVERRIDE(OP): Performed by: STUDENT IN AN ORGANIZED HEALTH CARE EDUCATION/TRAINING PROGRAM

## 2023-01-20 PROCEDURE — 72100 X-RAY EXAM L-S SPINE 2/3 VWS: CPT

## 2023-01-20 PROCEDURE — 87491 CHLMYD TRACH DNA AMP PROBE: CPT

## 2023-01-20 PROCEDURE — 81025 URINE PREGNANCY TEST: CPT

## 2023-01-20 PROCEDURE — A9270 NON-COVERED ITEM OR SERVICE: HCPCS | Performed by: STUDENT IN AN ORGANIZED HEALTH CARE EDUCATION/TRAINING PROGRAM

## 2023-01-20 PROCEDURE — 99284 EMERGENCY DEPT VISIT MOD MDM: CPT

## 2023-01-20 PROCEDURE — 36415 COLL VENOUS BLD VENIPUNCTURE: CPT

## 2023-01-20 RX ORDER — BETAMETHASONE DIPROPIONATE 0.5 MG/G
1 LOTION TOPICAL 2 TIMES DAILY
Qty: 60 ML | Refills: 0 | Status: SHIPPED | OUTPATIENT
Start: 2023-01-20

## 2023-01-20 RX ORDER — LIDOCAINE 50 MG/G
1 PATCH TOPICAL ONCE
Status: DISCONTINUED | OUTPATIENT
Start: 2023-01-20 | End: 2023-01-20 | Stop reason: HOSPADM

## 2023-01-20 RX ORDER — LIDOCAINE 50 MG/G
1 PATCH TOPICAL EVERY 24 HOURS
Qty: 10 PATCH | Refills: 0 | Status: SHIPPED | OUTPATIENT
Start: 2023-01-20

## 2023-01-20 RX ORDER — METHOCARBAMOL 750 MG/1
1500 TABLET, FILM COATED ORAL 3 TIMES DAILY
Qty: 20 TABLET | Refills: 0 | Status: SHIPPED | OUTPATIENT
Start: 2023-01-20

## 2023-01-20 RX ORDER — NAPROXEN 375 MG/1
375 TABLET ORAL 2 TIMES DAILY WITH MEALS
Qty: 20 TABLET | Refills: 0 | Status: SHIPPED | OUTPATIENT
Start: 2023-01-20

## 2023-01-20 RX ORDER — PERMETHRIN 50 MG/G
CREAM TOPICAL
Qty: 30 G | Refills: 0 | Status: SHIPPED | OUTPATIENT
Start: 2023-01-20

## 2023-01-20 RX ORDER — ACETAMINOPHEN 500 MG
1000 TABLET ORAL ONCE
Status: COMPLETED | OUTPATIENT
Start: 2023-01-20 | End: 2023-01-20

## 2023-01-20 RX ORDER — DIPHENHYDRAMINE HCL 25 MG
50 TABLET ORAL ONCE
Status: COMPLETED | OUTPATIENT
Start: 2023-01-20 | End: 2023-01-20

## 2023-01-20 RX ADMIN — DIPHENHYDRAMINE HYDROCHLORIDE 50 MG: 25 TABLET ORAL at 05:44

## 2023-01-20 RX ADMIN — ACETAMINOPHEN 1000 MG: 500 TABLET ORAL at 05:44

## 2023-01-20 RX ADMIN — LIDOCAINE 1 PATCH: 50 PATCH TOPICAL at 07:21
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
## 2023-01-20 NOTE — ED NOTES
Pt provided verbal and written dc instructions, vss, no piv, pt verbalized understanding, no apparent distress, pt ambulated out of ed independently.

## 2023-01-20 NOTE — Clinical Note
Jazmin Tong was seen and treated in our emergency department on 1/20/2023.  She may return to work on 01/25/2023.       If you have any questions or concerns, please don't hesitate to call.      Oni Mistry M.D.
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
## 2023-03-20 ENCOUNTER — HOSPITAL ENCOUNTER (EMERGENCY)
Facility: MEDICAL CENTER | Age: 38
End: 2023-03-20
Attending: EMERGENCY MEDICINE
Payer: MEDICAID

## 2023-03-20 ENCOUNTER — APPOINTMENT (OUTPATIENT)
Dept: RADIOLOGY | Facility: MEDICAL CENTER | Age: 38
End: 2023-03-20
Attending: EMERGENCY MEDICINE
Payer: MEDICAID

## 2023-03-20 VITALS
OXYGEN SATURATION: 97 % | SYSTOLIC BLOOD PRESSURE: 124 MMHG | DIASTOLIC BLOOD PRESSURE: 65 MMHG | HEIGHT: 63 IN | BODY MASS INDEX: 38.36 KG/M2 | HEART RATE: 67 BPM | RESPIRATION RATE: 17 BRPM | WEIGHT: 216.49 LBS | TEMPERATURE: 97.1 F

## 2023-03-20 DIAGNOSIS — R06.02 SOB (SHORTNESS OF BREATH): ICD-10-CM

## 2023-03-20 DIAGNOSIS — J45.31 MILD PERSISTENT REACTIVE AIRWAY DISEASE WITH ACUTE EXACERBATION: ICD-10-CM

## 2023-03-20 LAB
ALBUMIN SERPL BCP-MCNC: 4.3 G/DL (ref 3.2–4.9)
ALBUMIN/GLOB SERPL: 1 G/DL
ALP SERPL-CCNC: 59 U/L (ref 30–99)
ALT SERPL-CCNC: 18 U/L (ref 2–50)
ANION GAP SERPL CALC-SCNC: 13 MMOL/L (ref 7–16)
AST SERPL-CCNC: 27 U/L (ref 12–45)
BASOPHILS # BLD AUTO: 0.3 % (ref 0–1.8)
BASOPHILS # BLD: 0.02 K/UL (ref 0–0.12)
BILIRUB SERPL-MCNC: 0.3 MG/DL (ref 0.1–1.5)
BUN SERPL-MCNC: 10 MG/DL (ref 8–22)
CALCIUM ALBUM COR SERPL-MCNC: 9.2 MG/DL (ref 8.5–10.5)
CALCIUM SERPL-MCNC: 9.4 MG/DL (ref 8.5–10.5)
CHLORIDE SERPL-SCNC: 102 MMOL/L (ref 96–112)
CO2 SERPL-SCNC: 23 MMOL/L (ref 20–33)
CREAT SERPL-MCNC: 0.58 MG/DL (ref 0.5–1.4)
EKG IMPRESSION: NORMAL
EOSINOPHIL # BLD AUTO: 0.19 K/UL (ref 0–0.51)
EOSINOPHIL NFR BLD: 2.7 % (ref 0–6.9)
ERYTHROCYTE [DISTWIDTH] IN BLOOD BY AUTOMATED COUNT: 50.4 FL (ref 35.9–50)
GFR SERPLBLD CREATININE-BSD FMLA CKD-EPI: 121 ML/MIN/1.73 M 2
GLOBULIN SER CALC-MCNC: 4.4 G/DL (ref 1.9–3.5)
GLUCOSE SERPL-MCNC: 84 MG/DL (ref 65–99)
HCG SERPL QL: NEGATIVE
HCT VFR BLD AUTO: 42.8 % (ref 37–47)
HGB BLD-MCNC: 13.4 G/DL (ref 12–16)
IMM GRANULOCYTES # BLD AUTO: 0.02 K/UL (ref 0–0.11)
IMM GRANULOCYTES NFR BLD AUTO: 0.3 % (ref 0–0.9)
LYMPHOCYTES # BLD AUTO: 1.7 K/UL (ref 1–4.8)
LYMPHOCYTES NFR BLD: 23.8 % (ref 22–41)
MCH RBC QN AUTO: 26.6 PG (ref 27–33)
MCHC RBC AUTO-ENTMCNC: 31.3 G/DL (ref 33.6–35)
MCV RBC AUTO: 85.1 FL (ref 81.4–97.8)
MONOCYTES # BLD AUTO: 0.32 K/UL (ref 0–0.85)
MONOCYTES NFR BLD AUTO: 4.5 % (ref 0–13.4)
NEUTROPHILS # BLD AUTO: 4.89 K/UL (ref 2–7.15)
NEUTROPHILS NFR BLD: 68.4 % (ref 44–72)
NRBC # BLD AUTO: 0 K/UL
NRBC BLD-RTO: 0 /100 WBC
PLATELET # BLD AUTO: 318 K/UL (ref 164–446)
PMV BLD AUTO: 9.7 FL (ref 9–12.9)
POTASSIUM SERPL-SCNC: 3.9 MMOL/L (ref 3.6–5.5)
PROT SERPL-MCNC: 8.7 G/DL (ref 6–8.2)
RBC # BLD AUTO: 5.03 M/UL (ref 4.2–5.4)
SODIUM SERPL-SCNC: 138 MMOL/L (ref 135–145)
WBC # BLD AUTO: 7.1 K/UL (ref 4.8–10.8)

## 2023-03-20 PROCEDURE — 85025 COMPLETE CBC W/AUTO DIFF WBC: CPT

## 2023-03-20 PROCEDURE — 84703 CHORIONIC GONADOTROPIN ASSAY: CPT

## 2023-03-20 PROCEDURE — 71045 X-RAY EXAM CHEST 1 VIEW: CPT

## 2023-03-20 PROCEDURE — 94640 AIRWAY INHALATION TREATMENT: CPT

## 2023-03-20 PROCEDURE — 700111 HCHG RX REV CODE 636 W/ 250 OVERRIDE (IP): Performed by: EMERGENCY MEDICINE

## 2023-03-20 PROCEDURE — 36415 COLL VENOUS BLD VENIPUNCTURE: CPT

## 2023-03-20 PROCEDURE — 80053 COMPREHEN METABOLIC PANEL: CPT

## 2023-03-20 PROCEDURE — A9270 NON-COVERED ITEM OR SERVICE: HCPCS | Performed by: EMERGENCY MEDICINE

## 2023-03-20 PROCEDURE — 700102 HCHG RX REV CODE 250 W/ 637 OVERRIDE(OP): Performed by: EMERGENCY MEDICINE

## 2023-03-20 PROCEDURE — 99284 EMERGENCY DEPT VISIT MOD MDM: CPT

## 2023-03-20 PROCEDURE — 93005 ELECTROCARDIOGRAM TRACING: CPT

## 2023-03-20 PROCEDURE — 93005 ELECTROCARDIOGRAM TRACING: CPT | Performed by: EMERGENCY MEDICINE

## 2023-03-20 PROCEDURE — 700101 HCHG RX REV CODE 250: Performed by: EMERGENCY MEDICINE

## 2023-03-20 RX ORDER — ALBUTEROL SULFATE 90 UG/1
2 AEROSOL, METERED RESPIRATORY (INHALATION) EVERY 6 HOURS PRN
Qty: 8.5 G | Refills: 0 | Status: SHIPPED | OUTPATIENT
Start: 2023-03-20

## 2023-03-20 RX ORDER — PREDNISONE 20 MG/1
40 TABLET ORAL ONCE
Status: COMPLETED | OUTPATIENT
Start: 2023-03-20 | End: 2023-03-20

## 2023-03-20 RX ORDER — IPRATROPIUM BROMIDE AND ALBUTEROL SULFATE 2.5; .5 MG/3ML; MG/3ML
3 SOLUTION RESPIRATORY (INHALATION) ONCE
Status: COMPLETED | OUTPATIENT
Start: 2023-03-20 | End: 2023-03-20

## 2023-03-20 RX ORDER — ALBUTEROL SULFATE 90 UG/1
2 AEROSOL, METERED RESPIRATORY (INHALATION) ONCE
Status: COMPLETED | OUTPATIENT
Start: 2023-03-20 | End: 2023-03-20

## 2023-03-20 RX ORDER — PREDNISONE 20 MG/1
40 TABLET ORAL DAILY
Qty: 10 TABLET | Refills: 0 | Status: SHIPPED | OUTPATIENT
Start: 2023-03-20 | End: 2023-03-25

## 2023-03-20 RX ADMIN — ALBUTEROL SULFATE 2 PUFF: 90 AEROSOL, METERED RESPIRATORY (INHALATION) at 16:21

## 2023-03-20 RX ADMIN — IPRATROPIUM BROMIDE AND ALBUTEROL SULFATE 3 ML: 2.5; .5 SOLUTION RESPIRATORY (INHALATION) at 16:45

## 2023-03-20 RX ADMIN — PREDNISONE 40 MG: 20 TABLET ORAL at 16:20

## 2023-03-20 NOTE — ED TRIAGE NOTES
"Jazmin Tong  37 y.o. female  Chief Complaint   Patient presents with    Shortness of Breath     X 2-3 days. Pt reports she \"can't get a full breath.\"       Pt amb to triage with steady gait for above complaint. Pt admits to using fentanyl this AM.  Pt is alert and oriented, speaking in full sentences, follows commands and responds appropriately to questions. Not in any apparent distress. Respirations are even and unlabored.  Pt placed in lobby. Pt educated on triage process. Pt encouraged to alert staff for any changes.  This RN in appropriate PPE during encounter.  Pt placed in mask as well    "

## 2023-03-20 NOTE — ED PROVIDER NOTES
"  ER Provider Note    Scribed for Morgan Lee M.d. by Faith Kam. 3/20/2023  3:51 PM    Primary Care Provider: Pcp Pt States None    CHIEF COMPLAINT  Chief Complaint   Patient presents with    Shortness of Breath     X 2-3 days. Pt reports she \"can't get a full breath.\"     LIMITATION TO HISTORY   Select: : None    HPI/ROS  OUTSIDE HISTORIAN(S):  None    EXTERNAL RECORDS REVIEWED  Care everywhere      Jazmin Tong is a 37 y.o. female who presents to the ED for worsening intermittent episodes of shortness of breath lasting approximately 1 hour each onset 2 days ago. She states she feels \"juan out of it\" and has been having trouble breathing. She states it almost feels like a panic attack. She denies ever experiencing similar symptoms before. She admits to associated symptoms of coarse voice, but denies any chest pain, leg pain or swelling, vomiting, diarrhea, or fevers. No alleviating factors were reported. She denies any recent surgeries or chance of pregnancy. She reports she has just started smoking fentanyl with foil 2 weeks ago. She denies using any IV drugs. She states she could use some more Narcan to take home. She denies any personal history of asthma, but notes it runs in her family.     PAST MEDICAL HISTORY  No history of asthma.    SURGICAL HISTORY  Past Surgical History:   Procedure Laterality Date    GYN SURGERY       x2       FAMILY HISTORY  Asthma    SOCIAL HISTORY   reports that she has been smoking cigarettes. She has been smoking an average of .5 packs per day. She has never used smokeless tobacco. She reports that she does not currently use alcohol. She reports that she does not currently use drugs after having used the following drugs: Inhaled.    CURRENT MEDICATIONS  Previous Medications    BETAMETHASONE DIPROPIONATE 0.05 % LOTION    Apply 1 Application topically 2 times a day.    LEVONORGESTREL (MIRENA, 52 MG,) 52 MG (20 MCG/24 HR) IUD    1 Each by Intrauterine route " "Once.    LIDOCAINE (LIDODERM) 5 % PATCH    Place 1 Patch on the skin every 24 hours.    METHOCARBAMOL (ROBAXIN) 750 MG TAB    Take 2 Tablets by mouth 3 times a day.    NAPROXEN (NAPROSYN) 375 MG TAB    Take 1 Tablet by mouth 2 times a day with meals.    PERMETHRIN (ELIMITE) 5 % CREAM    Apply to affected areas topically from head to toe, leave on overnight, and wash off in the morning.       ALLERGIES  Patient has no known allergies.    PHYSICAL EXAM  /77   Pulse 81   Temp 35.9 °C (96.7 °F) (Temporal)   Resp 16   Ht 1.6 m (5' 3\")   Wt 98.2 kg (216 lb 7.9 oz)   SpO2 98%   BMI 38.35 kg/m²       Constitutional: Alert in no apparent distress.  HENT: No signs of trauma, Bilateral external ears normal, Nose normal. Uvula midline.   Eyes: Pupils are equal and reactive, Conjunctiva normal, Non-icteric.   Neck: Normal range of motion, No tenderness, Supple, No stridor.   Lymphatic: No lymphadenopathy noted.   Cardiovascular: Regular rate and rhythm, no murmurs.   Thorax & Lungs: Inspiratory and expiratory wheezing, No chest tenderness.   Abdomen: Bowel sounds normal, Soft, No tenderness, No peritoneal signs, No masses, No pulsatile masses.   Skin: Warm, Dry, No erythema, No rash.   Back: No bony tenderness, No CVA tenderness.   Extremities: Intact distal pulses, No edema, No tenderness, No cyanosis.  Musculoskeletal: Good range of motion in all major joints. No tenderness to palpation or major deformities noted.   Neurologic: Alert , Normal motor function, Normal sensory function, No focal deficits noted.   Psychiatric: Affect normal, Judgment normal, Mood normal.       DIAGNOSTIC STUDIES & PROCEDURES    Labs:   Labs Reviewed   CBC WITH DIFFERENTIAL - Abnormal; Notable for the following components:       Result Value    MCH 26.6 (*)     MCHC 31.3 (*)     RDW 50.4 (*)     All other components within normal limits   COMP METABOLIC PANEL - Abnormal; Notable for the following components:    Total Protein 8.7 (*)     " "Globulin 4.4 (*)     All other components within normal limits   CORRECTED CALCIUM   ESTIMATED GFR   HCG QUAL SERUM       All labs reviewed by me.    EKG:   Results for orders placed or performed during the hospital encounter of 23   EKG (NOW)   Result Value Ref Range    Report       [facility] Emergency Dept.    Test Date:  2023  Pt Name:    JAZMIN TONG                 Department: ER  MRN:        [id number]                      Room:  Gender:     Female                       Technician: [id number]  :        1985                   Requested By:ER TRIAGE PROTOCOL  Order #:    [id number]                    Reading MD:    Measurements  Intervals                                Axis  Rate:       67                           P:          46  UT:         145                          QRS:        1  QRSD:       99                           T:          45  QT:         414  QTc:        437    Interpretive Statements  Sinus rhythm  No previous ECG available for comparison         I have independently interpreted this EKG     Radiology:   The attending Emergency Physician has independently interpreted the diagnostic imaging associated with this visit and is awaiting the final reading from the radiologist, which will be displayed below.    Preliminary interpretation is a follows: no pna  Radiologist interpretation:   DX-CHEST-PORTABLE (1 VIEW)   Final Result      No evidence of acute cardiopulmonary process.           COURSE & MEDICAL DECISION MAKING    ED Observation Status? No; Patient does not meet criteria for ED Observation.     INITIAL ASSESSMENT AND PLAN  Care Narrative:         3:51 PM - Patient seen and evaluated at bedside. Jazmin Tong is a     37 y.o. female who presents with worsening intermittent episodes of shortness of breath lasting approximately 1 hour each onset 2 days ago. She states she feels \"juan out of it\" and has been having trouble breathing. She states it almost feels " like a panic attack. She denies ever experiencing similar symptoms before. She admits to associated symptoms of coarse voice, but denies any chest pain, leg pain or swelling, vomiting, diarrhea, or fevers. She reports she has just started smoking fentanyl with foil 2 weeks ago.     Differential diagnoses include but are not limited to:   #Shortness of Breath    Counseled upon fentanyl cessation  Given steroids for wheezing and concern for reactive airway disease/asthma exacerbation in setting of recent smoking fentanyl      Ordered EKG, DX-Chest, CMP, CBC w/ diff, Beta-HCG Qualitative Serum, and Pharmacy Consult Request for Intranasal Naloxone Distribution to evaluate. Patient will be treated with prednisone 40 mg tablet, albuterol inhaler 2 puff, and Duoneb nebulizer solution for her symptoms. She understands and agrees to the plan of care. I discussed with the patient the risks of opiate use, especially smoking. She will be discharged home with prescription for albuterol inhaler and steroids. I discussed plan for discharge and follow up as outlined below. The patient is stable for discharge at this time and will return for any new or worsening symptoms. Patient verbalizes understanding and support with my plan for discharge.        ADDITIONAL PROBLEM LIST AND DISPOSITION                 DISPOSITION AND DISCUSSIONS  I have discussed management of the patient with the following physicians and DEXTER's: None    Discussion of management with other [facility] or appropriate source(s): None and Pharmacy            The patient will return for new or worsening symptoms and is stable at the time of discharge.    The patient is referred to a primary physician for blood pressure management, diabetic screening, and for all other preventative health concerns.    DISPOSITION:  Patient will be discharged home in stable condition.    FOLLOW UP:  [facility], Emergency Dept  [address] [facility] [address]  [address]  [phone]    If symptoms worsen    [facility]  [address] [facility] [address]  [phone]  In 3 days      OUTPATIENT MEDICATIONS:  Discharge Medication List as of 3/20/2023  4:43 PM        START taking these medications    Details   predniSONE (DELTASONE) 20 MG Tab Take 2 Tablets by mouth every day for 5 days., Disp-10 Tablet, R-0, Normal      albuterol 108 (90 Base) MCG/ACT Aero Soln inhalation aerosol Inhale 2 Puffs every 6 hours as needed for Shortness of Breath., Disp-8.5 g, R-0, Normal             FINAL IMPRESSION   1. SOB (shortness of breath)    2. Mild persistent reactive airway disease with acute exacerbation         Faith PAIGE (Scribe), am scribing for, and in the presence of, Morgan Lee M.D..    Electronically signed by: Faith Kam (Scribe), 3/20/2023    IMorgan M.D. personally performed the services described in this documentation, as scribed by Faith Kam in my presence, and it is both accurate and complete.    The note accurately reflects work and decisions made by me.  Morgan Lee M.D.  3/20/2023  8:27 PM

## 2023-03-20 NOTE — ED NOTES
Naloxone 4 mg/0.1 mL nasal spray (2 pack) was dispensed to the patient for prevention of potential opioid related overdose per protocol.     Counseling was provided regarding:  - Information relating to the recognition, prevention and responses to opioid-related drug overdoses  - How to safely administer the naloxone nasal spray  - Potential side effects and adverse events related to the naloxone nasal spray  - The importance of seeking immediate emergency medical assistance for a person experiencing an opioid-related drug overdose, even after the administration of naloxone  - The immunity from certain civil or criminal liabilities for seeking medical assistance for a person experiencing an opioid-related overdose    Naloxone was given to the patient.     Odilia Galvez, PharmD

## 2023-03-21 NOTE — ED NOTES
Pt provided with discharge instructions. Pt verbalizes understanding. Pt assisted out of ed with steady gait.

## 2023-09-29 ENCOUNTER — HOSPITAL ENCOUNTER (EMERGENCY)
Facility: MEDICAL CENTER | Age: 38
End: 2023-09-29
Attending: STUDENT IN AN ORGANIZED HEALTH CARE EDUCATION/TRAINING PROGRAM
Payer: MEDICAID

## 2023-09-29 ENCOUNTER — APPOINTMENT (OUTPATIENT)
Dept: RADIOLOGY | Facility: MEDICAL CENTER | Age: 38
End: 2023-09-29
Attending: STUDENT IN AN ORGANIZED HEALTH CARE EDUCATION/TRAINING PROGRAM
Payer: MEDICAID

## 2023-09-29 VITALS
HEIGHT: 63 IN | DIASTOLIC BLOOD PRESSURE: 59 MMHG | SYSTOLIC BLOOD PRESSURE: 102 MMHG | RESPIRATION RATE: 16 BRPM | WEIGHT: 215 LBS | BODY MASS INDEX: 38.09 KG/M2 | HEART RATE: 78 BPM | OXYGEN SATURATION: 96 % | TEMPERATURE: 98 F

## 2023-09-29 DIAGNOSIS — L03.116 LEFT LEG CELLULITIS: ICD-10-CM

## 2023-09-29 DIAGNOSIS — L03.116 CELLULITIS OF LEFT LEG: ICD-10-CM

## 2023-09-29 LAB
ALBUMIN SERPL BCP-MCNC: 3.5 G/DL (ref 3.2–4.9)
ALBUMIN/GLOB SERPL: 1 G/DL
ALP SERPL-CCNC: 46 U/L (ref 30–99)
ALT SERPL-CCNC: 36 U/L (ref 2–50)
ANION GAP SERPL CALC-SCNC: 10 MMOL/L (ref 7–16)
AST SERPL-CCNC: 58 U/L (ref 12–45)
BASOPHILS # BLD AUTO: 0.3 % (ref 0–1.8)
BASOPHILS # BLD: 0.02 K/UL (ref 0–0.12)
BILIRUB SERPL-MCNC: 0.4 MG/DL (ref 0.1–1.5)
BUN SERPL-MCNC: 9 MG/DL (ref 8–22)
CALCIUM ALBUM COR SERPL-MCNC: 9.4 MG/DL (ref 8.5–10.5)
CALCIUM SERPL-MCNC: 9 MG/DL (ref 8.5–10.5)
CHLORIDE SERPL-SCNC: 104 MMOL/L (ref 96–112)
CO2 SERPL-SCNC: 26 MMOL/L (ref 20–33)
CREAT SERPL-MCNC: 0.56 MG/DL (ref 0.5–1.4)
EKG IMPRESSION: NORMAL
EOSINOPHIL # BLD AUTO: 0.06 K/UL (ref 0–0.51)
EOSINOPHIL NFR BLD: 0.9 % (ref 0–6.9)
ERYTHROCYTE [DISTWIDTH] IN BLOOD BY AUTOMATED COUNT: 48 FL (ref 35.9–50)
GFR SERPLBLD CREATININE-BSD FMLA CKD-EPI: 121 ML/MIN/1.73 M 2
GLOBULIN SER CALC-MCNC: 3.6 G/DL (ref 1.9–3.5)
GLUCOSE SERPL-MCNC: 80 MG/DL (ref 65–99)
HCT VFR BLD AUTO: 41.7 % (ref 37–47)
HGB BLD-MCNC: 13 G/DL (ref 12–16)
HIV 1+2 AB+HIV1 P24 AG SERPL QL IA: NORMAL
IMM GRANULOCYTES # BLD AUTO: 0.05 K/UL (ref 0–0.11)
IMM GRANULOCYTES NFR BLD AUTO: 0.7 % (ref 0–0.9)
LYMPHOCYTES # BLD AUTO: 0.84 K/UL (ref 1–4.8)
LYMPHOCYTES NFR BLD: 12.4 % (ref 22–41)
MCH RBC QN AUTO: 27.4 PG (ref 27–33)
MCHC RBC AUTO-ENTMCNC: 31.2 G/DL (ref 32.2–35.5)
MCV RBC AUTO: 87.8 FL (ref 81.4–97.8)
MONOCYTES # BLD AUTO: 0.44 K/UL (ref 0–0.85)
MONOCYTES NFR BLD AUTO: 6.5 % (ref 0–13.4)
NEUTROPHILS # BLD AUTO: 5.37 K/UL (ref 1.82–7.42)
NEUTROPHILS NFR BLD: 79.2 % (ref 44–72)
NRBC # BLD AUTO: 0 K/UL
NRBC BLD-RTO: 0 /100 WBC (ref 0–0.2)
PLATELET # BLD AUTO: 186 K/UL (ref 164–446)
PMV BLD AUTO: 9.8 FL (ref 9–12.9)
POTASSIUM SERPL-SCNC: 2.8 MMOL/L (ref 3.6–5.5)
PROT SERPL-MCNC: 7.1 G/DL (ref 6–8.2)
RBC # BLD AUTO: 4.75 M/UL (ref 4.2–5.4)
SODIUM SERPL-SCNC: 140 MMOL/L (ref 135–145)
T PALLIDUM AB SER QL IA: NORMAL
WBC # BLD AUTO: 6.8 K/UL (ref 4.8–10.8)

## 2023-09-29 PROCEDURE — 87591 N.GONORRHOEAE DNA AMP PROB: CPT

## 2023-09-29 PROCEDURE — 87491 CHLMYD TRACH DNA AMP PROBE: CPT

## 2023-09-29 PROCEDURE — 700111 HCHG RX REV CODE 636 W/ 250 OVERRIDE (IP): Mod: JZ,UD | Performed by: STUDENT IN AN ORGANIZED HEALTH CARE EDUCATION/TRAINING PROGRAM

## 2023-09-29 PROCEDURE — 700101 HCHG RX REV CODE 250: Mod: UD | Performed by: STUDENT IN AN ORGANIZED HEALTH CARE EDUCATION/TRAINING PROGRAM

## 2023-09-29 PROCEDURE — 99284 EMERGENCY DEPT VISIT MOD MDM: CPT

## 2023-09-29 PROCEDURE — 36415 COLL VENOUS BLD VENIPUNCTURE: CPT

## 2023-09-29 PROCEDURE — 85025 COMPLETE CBC W/AUTO DIFF WBC: CPT

## 2023-09-29 PROCEDURE — 86780 TREPONEMA PALLIDUM: CPT

## 2023-09-29 PROCEDURE — 73620 X-RAY EXAM OF FOOT: CPT | Mod: LT

## 2023-09-29 PROCEDURE — 700102 HCHG RX REV CODE 250 W/ 637 OVERRIDE(OP): Mod: JZ,UD | Performed by: STUDENT IN AN ORGANIZED HEALTH CARE EDUCATION/TRAINING PROGRAM

## 2023-09-29 PROCEDURE — 87389 HIV-1 AG W/HIV-1&-2 AB AG IA: CPT

## 2023-09-29 PROCEDURE — 73590 X-RAY EXAM OF LOWER LEG: CPT | Mod: LT

## 2023-09-29 PROCEDURE — A9270 NON-COVERED ITEM OR SERVICE: HCPCS | Mod: JZ,UD | Performed by: STUDENT IN AN ORGANIZED HEALTH CARE EDUCATION/TRAINING PROGRAM

## 2023-09-29 PROCEDURE — 71045 X-RAY EXAM CHEST 1 VIEW: CPT

## 2023-09-29 PROCEDURE — 96372 THER/PROPH/DIAG INJ SC/IM: CPT

## 2023-09-29 PROCEDURE — 93005 ELECTROCARDIOGRAM TRACING: CPT | Performed by: STUDENT IN AN ORGANIZED HEALTH CARE EDUCATION/TRAINING PROGRAM

## 2023-09-29 PROCEDURE — 80053 COMPREHEN METABOLIC PANEL: CPT

## 2023-09-29 PROCEDURE — 93005 ELECTROCARDIOGRAM TRACING: CPT

## 2023-09-29 RX ORDER — CLINDAMYCIN HYDROCHLORIDE 300 MG/1
300 CAPSULE ORAL 3 TIMES DAILY
Qty: 15 CAPSULE | Refills: 0 | Status: ACTIVE | OUTPATIENT
Start: 2023-09-29 | End: 2023-09-29 | Stop reason: SDUPTHER

## 2023-09-29 RX ORDER — CEFTRIAXONE 500 MG/1
500 INJECTION, POWDER, FOR SOLUTION INTRAMUSCULAR; INTRAVENOUS ONCE
Status: COMPLETED | OUTPATIENT
Start: 2023-09-29 | End: 2023-09-29

## 2023-09-29 RX ORDER — HYDROCODONE BITARTRATE AND ACETAMINOPHEN 5; 325 MG/1; MG/1
1 TABLET ORAL ONCE
Status: COMPLETED | OUTPATIENT
Start: 2023-09-29 | End: 2023-09-29

## 2023-09-29 RX ORDER — CLINDAMYCIN HYDROCHLORIDE 150 MG/1
450 CAPSULE ORAL ONCE
Status: COMPLETED | OUTPATIENT
Start: 2023-09-29 | End: 2023-09-29

## 2023-09-29 RX ORDER — CLINDAMYCIN HYDROCHLORIDE 300 MG/1
300 CAPSULE ORAL 3 TIMES DAILY
Qty: 30 CAPSULE | Refills: 0 | Status: ACTIVE | OUTPATIENT
Start: 2023-09-29 | End: 2023-09-29 | Stop reason: SDUPTHER

## 2023-09-29 RX ORDER — CLINDAMYCIN HYDROCHLORIDE 300 MG/1
300 CAPSULE ORAL 3 TIMES DAILY
Qty: 30 CAPSULE | Refills: 0 | Status: ACTIVE | OUTPATIENT
Start: 2023-09-29 | End: 2023-10-09

## 2023-09-29 RX ORDER — POTASSIUM CHLORIDE 20 MEQ/1
40 TABLET, EXTENDED RELEASE ORAL ONCE
Status: COMPLETED | OUTPATIENT
Start: 2023-09-29 | End: 2023-09-29

## 2023-09-29 RX ORDER — AZITHROMYCIN 250 MG/1
1000 TABLET, FILM COATED ORAL ONCE
Status: COMPLETED | OUTPATIENT
Start: 2023-09-29 | End: 2023-09-29

## 2023-09-29 RX ADMIN — CEFTRIAXONE SODIUM 500 MG: 500 INJECTION, POWDER, FOR SOLUTION INTRAMUSCULAR; INTRAVENOUS at 21:38

## 2023-09-29 RX ADMIN — LIDOCAINE HYDROCHLORIDE 1 ML: 10 INJECTION, SOLUTION EPIDURAL; INFILTRATION; INTRACAUDAL at 21:38

## 2023-09-29 RX ADMIN — AZITHROMYCIN DIHYDRATE 1000 MG: 250 TABLET, FILM COATED ORAL at 21:37

## 2023-09-29 RX ADMIN — HYDROCODONE BITARTRATE AND ACETAMINOPHEN 1 TABLET: 5; 325 TABLET ORAL at 21:37

## 2023-09-29 RX ADMIN — POTASSIUM CHLORIDE 40 MEQ: 1500 TABLET, EXTENDED RELEASE ORAL at 20:30

## 2023-09-29 RX ADMIN — CLINDAMYCIN HYDROCHLORIDE 450 MG: 150 CAPSULE ORAL at 20:30

## 2023-09-29 ASSESSMENT — FIBROSIS 4 INDEX: FIB4 SCORE: 0.72

## 2023-09-30 NOTE — ED TRIAGE NOTES
"Chief Complaint   Patient presents with    Leg Swelling     Left lower leg swelling, redness, and warm to touch. States that she \"fell asleep and passed out on it for 12-16hours.\" She reports intermittent SOB       Patient to triage via wheelchair, AAOx4, Appropriate precautions in place.     Explained wait time and triage process. Placed back in lobby. Told to notify ED tech or RN of any changes, verbalized understanding.    /78   Pulse 95   Temp 35.9 °C (96.6 °F) (Temporal)   Resp 16   Ht 1.6 m (5' 3\")   Wt 97.5 kg (215 lb)   SpO2 100%   BMI 38.09 kg/m²     "

## 2023-09-30 NOTE — ED PROVIDER NOTES
"ED Provider Note    CHIEF COMPLAINT  Chief Complaint   Patient presents with    Leg Swelling     Left lower leg swelling, redness, and warm to touch. States that she \"fell asleep and passed out on it for 12-16hours.\" She reports intermittent SOB       EXTERNAL RECORDS REVIEWED  PDMP score of 0    HPI/ROS  LIMITATION TO HISTORY   Select: : None  OUTSIDE HISTORIAN(S):      Jazmin Tong is a 38 y.o. female who presents with left leg pain ongoing for the past 12 hours.  Patient reports a history of cellulitis to the right leg and reports this feels similar.  Reports chronic bilateral lower extremity edema.  Denies fevers, vomiting, chills    PAST MEDICAL HISTORY       SURGICAL HISTORY   has a past surgical history that includes gyn surgery.    FAMILY HISTORY  History reviewed. No pertinent family history.    SOCIAL HISTORY  Social History     Tobacco Use    Smoking status: Every Day     Current packs/day: 0.50     Types: Cigarettes    Smokeless tobacco: Never   Vaping Use    Vaping Use: Every day   Substance and Sexual Activity    Alcohol use: Not Currently     Comment: \"a beer maybe a couple times a week\"; none since 11/7/22    Drug use: Not Currently     Types: Inhaled     Comment: hx of heroin & meth    Sexual activity: Not on file       CURRENT MEDICATIONS  Home Medications       Reviewed by Shahida Pena R.N. (Registered Nurse) on 09/29/23 at 1741  Med List Status: Partial     Medication Last Dose Status   albuterol 108 (90 Base) MCG/ACT Aero Soln inhalation aerosol  Active   betamethasone dipropionate 0.05 % lotion  Active   levonorgestrel (MIRENA, 52 MG,) 52 mg (20 mcg/24 hr) IUD  Active   lidocaine (LIDODERM) 5 % Patch  Active   methocarbamol (ROBAXIN) 750 MG Tab  Active   naproxen (NAPROSYN) 375 MG Tab  Active   permethrin (ELIMITE) 5 % Cream  Active                    ALLERGIES  No Known Allergies    PHYSICAL EXAM  VITAL SIGNS: /70   Pulse 83   Temp 35.9 °C (96.6 °F) (Temporal)   Resp 16   Ht 1.6 " "m (5' 3\")   Wt 97.5 kg (215 lb)   SpO2 97%   BMI 38.09 kg/m²    Physical Exam  Vitals and nursing note reviewed.   Constitutional:       Appearance: She is well-developed. She is obese.   HENT:      Head: Normocephalic.   Eyes:      Extraocular Movements: Extraocular movements intact.      Pupils: Pupils are equal, round, and reactive to light.   Cardiovascular:      Rate and Rhythm: Normal rate and regular rhythm.      Heart sounds: No murmur heard.  Pulmonary:      Effort: Pulmonary effort is normal.      Breath sounds: Normal breath sounds.   Abdominal:      Palpations: Abdomen is soft.      Tenderness: There is no abdominal tenderness.   Musculoskeletal:      Cervical back: Normal range of motion.   Neurological:      Mental Status: She is alert and oriented to person, place, and time.         DIAGNOSTIC STUDIES / PROCEDURES  EKG  I have independently interpreted this EKG  Results for orders placed or performed during the hospital encounter of 23   EKG (NOW)   Result Value Ref Range    Report       [facility] Emergency Dept.    Test Date:  2023  Pt Name:    APOLINAR MORSE                 Department: ER  MRN:        [id number]                      Room:  Gender:     Female                       Technician: [id number]  :        1985                   Requested By:ER TRIAGE PROTOCOL  Order #:    [id number]                    Reading MD: Frank Conway    Measurements  Intervals                                Axis  Rate:       92                           P:          70  NV:         155                          QRS:        -28  QRSD:       103                          T:          46  QT:         365  QTc:        452    Interpretive Statements  Sinus rhythm  Borderline left axis deviation  Compared to ECG 2023 14:12:11  No significant changes  Electronically Signed On 2023 21:01:52 PDT by Frank Conway         LABS  Labs Reviewed   CBC WITH DIFFERENTIAL - Abnormal; Notable for " the following components:       Result Value    MCHC 31.2 (*)     Neutrophils-Polys 79.20 (*)     Lymphocytes 12.40 (*)     Lymphs (Absolute) 0.84 (*)     All other components within normal limits   COMP METABOLIC PANEL - Abnormal; Notable for the following components:    Potassium 2.8 (*)     AST(SGOT) 58 (*)     Globulin 3.6 (*)     All other components within normal limits   T.PALLIDUM AB HOLLAND (SCREENING)   HIV AG/AB COMBO ASSAY SCREENING   ESTIMATED GFR   CHLAMYDIA/GC, PCR (URINE)         RADIOLOGY  I have independently interpreted the diagnostic imaging associated with this visit and am waiting the final reading from the radiologist.   My preliminary interpretation is as follows: X-ray of the leg and foot shows soft tissue swelling however no free air or bony abnormality chest x-ray no acute process  Radiologist interpretation:   DX-FOOT-2- LEFT   Final Result      Soft tissue swelling without acute osseous abnormality.      DX-TIBIA AND FIBULA LEFT   Final Result      No evidence of fracture or dislocation.      DX-CHEST-PORTABLE (1 VIEW)   Final Result      No acute cardiopulmonary abnormality.            Point of Care Ultrasound    ED POINT OF CARE ULTRASOUND: LIMITED SKIN/SOFT TISSUE, FOREIGN BODY IDENTIFICATION    Indication: Swelling and Pain  Procedure: A limited ultrasound of the patients skin and soft tissue was performed at the area of clinical concern as noted.     There was not evidence of a foreign body present.   There was evidence of cobblestoning of the soft tissues.   A localized fluid collection was not present.     Impression: Based on this limited bedside ultrasound, there was evidence of cellulitis, and a drainable fluid collection was not seen. There was not evidence of a foreign body. Further clinical interpretation or comments:     Image retained through [email or web address] as seen below:         Additional interpretation: Cobblestoning consistent with clinical diagnosis of cellulitis    This study is  a limited ultrasound examination performed and interpreted to evaluate for limited conditions as outlined above. There may be other clinically important information contained in the images that is outside this scope. When clinically warranted, a comprehensive ultrasound through the appropriate department is considered.  COURSE & MEDICAL DECISION MAKING    ED Observation Status? No; Patient does not meet criteria for ED Observation.     INITIAL ASSESSMENT, COURSE AND PLAN  Care Narrative: 38-year-old female with polysubstance abuse history presents to the ED for left lower extremity pain, redness and swelling.  On exam her vitals are normal and she appears well however her left lower extremity is erythematous, swollen and warm to the touch.  There is a dorsalis pedis pulse that is present with Doppler's and equal bilaterally.  Differential diagnosis includes DVT, cellulitis, necrotizing fasciitis, abscess    Clinically the patient does appear to have a cellulitis of the left lower extremity.  She has chronic obesity and some left lower extremity edema at baseline however does note worsening redness and pain to the leg.  Her lab work-up is reassuring and she does not have a leukocytosis.  She was found to be mildly to moderately hypokalemic and was orally repleted here.  I started the patient on clindamycin.  I feel that since she does not have systemic symptoms or laboratory derangements she is most appropriate for the outpatient setting at this time.  She voiced ability to afford and obtain outpatient antibiotics.  She requested empiric treatment for G/C so I gave her IM Rocephin and oral azithromycin.  Her syphilis screening test was negative.  Her first dose of clindamycin was given here.  I noted with the patient that her cellulitis was localized from her left foot up to her tibial tubercle and advised her that if it continues to worsen despite antibiotic therapy that she needs to return the ED for a wound check  and eval for potential admission.  Patient voiced understanding of this and was amenable to this plan.        ADDITIONAL PROBLEM LIST  Drug abuse    DISPOSITION AND DISCUSSIONS  I have discussed management of the patient with the following physicians and DEXTER's:      Discussion of management with other [facility] or appropriate source(s): Discussed with pharmacy extending the course from the recommended 5 days to longer and they are in agreement that this is appropriate given the involvement of the entire left lower leg    Escalation of care considered, and ultimately not performed:acute inpatient care management, however at this time, the patient is most appropriate for outpatient management    Barriers to care at this time, including but not limited to: Patient does not have established PCP.     Decision tools and prescription drugs considered including, but not limited to: Antibiotics clindamycin for 10 days .    FINAL DIAGNOSIS  1. Cellulitis of left leg    2. Left leg cellulitis           Electronically signed by: Frank Conway M.D., 9/29/2023 9:02 PM
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
## 2024-01-04 ENCOUNTER — HOSPITAL ENCOUNTER (EMERGENCY)
Facility: MEDICAL CENTER | Age: 39
End: 2024-01-04
Attending: EMERGENCY MEDICINE
Payer: MEDICAID

## 2024-01-04 VITALS
WEIGHT: 198.63 LBS | SYSTOLIC BLOOD PRESSURE: 122 MMHG | BODY MASS INDEX: 35.2 KG/M2 | TEMPERATURE: 98 F | HEART RATE: 85 BPM | RESPIRATION RATE: 16 BRPM | HEIGHT: 63 IN | OXYGEN SATURATION: 96 % | DIASTOLIC BLOOD PRESSURE: 84 MMHG

## 2024-01-04 DIAGNOSIS — S60.459A FOREIGN BODY OF FINGER WITH INFECTION, INITIAL ENCOUNTER: ICD-10-CM

## 2024-01-04 DIAGNOSIS — L08.9 FOREIGN BODY OF FINGER WITH INFECTION, INITIAL ENCOUNTER: ICD-10-CM

## 2024-01-04 DIAGNOSIS — L02.511 ABSCESS OF FINGER, RIGHT: Primary | ICD-10-CM

## 2024-01-04 PROCEDURE — 303977 HCHG I & D

## 2024-01-04 PROCEDURE — 99282 EMERGENCY DEPT VISIT SF MDM: CPT | Mod: 25

## 2024-01-04 PROCEDURE — 304217 HCHG IRRIGATION SYSTEM

## 2024-01-04 PROCEDURE — 700111 HCHG RX REV CODE 636 W/ 250 OVERRIDE (IP): Mod: JZ,UD | Performed by: EMERGENCY MEDICINE

## 2024-01-04 RX ORDER — SULFAMETHOXAZOLE AND TRIMETHOPRIM 800; 160 MG/1; MG/1
1 TABLET ORAL 2 TIMES DAILY
Qty: 14 TABLET | Refills: 0 | Status: ACTIVE | OUTPATIENT
Start: 2024-01-04 | End: 2024-01-11

## 2024-01-04 RX ORDER — SULFAMETHOXAZOLE AND TRIMETHOPRIM 800; 160 MG/1; MG/1
1 TABLET ORAL ONCE
Status: DISCONTINUED | OUTPATIENT
Start: 2024-01-04 | End: 2024-01-04 | Stop reason: HOSPADM

## 2024-01-04 RX ADMIN — LIDOCAINE HYDROCHLORIDE 3 ML: 10 INJECTION, SOLUTION EPIDURAL; INFILTRATION; INTRACAUDAL; PERINEURAL at 16:00

## 2024-01-04 ASSESSMENT — FIBROSIS 4 INDEX: FIB4 SCORE: 1.87

## 2024-01-04 NOTE — ED TRIAGE NOTES
Chief Complaint   Patient presents with    Digit Pain     Right middle finger swelling. Pt reports she got a splinter in her finger on 12/23. Went to assisted and was detoxing and was unable to get it looked at. States was draining fluid but that has stopped. States just wants to get it looked at for infection.      Pt ambulates to triage for above.     Reports using meth today.     Pt to lobby, educated on triage process, thanked for patience.

## 2024-01-04 NOTE — ED PROVIDER NOTES
ER Provider Note    Scribed for Dat Shaikh Ii, M.d. by Maryanne Tyler. 2024  3:49 PM    Primary Care Provider: Pcp Pt States None    CHIEF COMPLAINT  Chief Complaint   Patient presents with    Digit Pain     Right middle finger swelling. Pt reports she got a splinter in her finger on . Went to [facility] and was detoxing and was unable to get it looked at. States was draining fluid but that has stopped. States just wants to get it looked at for infection.      EXTERNAL RECORDS REVIEWED  None.    HPI/ROS  LIMITATION TO HISTORY   Select: : None  OUTSIDE HISTORIAN(S):  None.    Jazmin Tong is a 38 y.o. female who presents to the ED for evaluation of right middle finger pain.  The patient reports that she got a splinter in her finger on . She went to [facility] and was detoxing and was unable to get a good look at it until she was released. She describes that the wound opened earlier today and drained some. Its closed and not draining now. She denies any pain. No alleviating or exacerbating factors noted.     PAST MEDICAL HISTORY  History reviewed. No pertinent past medical history.    SURGICAL HISTORY  Past Surgical History:   Procedure Laterality Date    GYN SURGERY       x2     FAMILY HISTORY  History reviewed. No pertinent family history.    SOCIAL HISTORY   reports that she has been smoking cigarettes. She has never used smokeless tobacco. She reports that she does not currently use alcohol. She reports current drug use. Drug: Inhaled.    CURRENT MEDICATIONS  Discharge Medication List as of 2024  4:16 PM        CONTINUE these medications which have NOT CHANGED    Details   albuterol 108 (90 Base) MCG/ACT Aero Soln inhalation aerosol Inhale 2 Puffs every 6 hours as needed for Shortness of Breath., Disp-8.5 g, R-0, Normal      permethrin (ELIMITE) 5 % Cream Apply to affected areas topically from head to toe, leave on overnight, and wash off in the morning., Disp-30 g,  "R-0, Normal      betamethasone dipropionate 0.05 % lotion Apply 1 Application topically 2 times a day., Disp-60 mL, R-0, Normal      lidocaine (LIDODERM) 5 % Patch Place 1 Patch on the skin every 24 hours., Disp-10 Patch, R-0, Normal      naproxen (NAPROSYN) 375 MG Tab Take 1 Tablet by mouth 2 times a day with meals., Disp-20 Tablet, R-0, Normal      methocarbamol (ROBAXIN) 750 MG Tab Take 2 Tablets by mouth 3 times a day., Disp-20 Tablet, R-0, Normal      levonorgestrel (MIRENA, 52 MG,) 52 mg (20 mcg/24 hr) IUD 1 Each by Intrauterine route Once., Historical Med           ALLERGIES  Patient has no known allergies.      PHYSICAL EXAM  /87   Pulse 90   Temp 36.7 °C (98 °F) (Temporal)   Resp 17   Ht 1.6 m (5' 3\")   Wt 90.1 kg (198 lb 10.2 oz)   SpO2 98%   BMI 35.19 kg/m²     Physical Exam  Vitals and nursing note reviewed.   Constitutional:       Appearance: Normal appearance.   Musculoskeletal:         General: No tenderness.      Comments: Right proximal middle finger on the lateral side has an area of fluctuance.   Neurological:      Mental Status: She is alert.        DIAGNOSTIC STUDIES AND PROCEDURES      Incision and Drainage Procedure Note    Indication: Abscess    Procedure: The patient was positioned appropriately and the skin over the incision site was prepped with chlorhexidine. Local anesthesia was not performed at the patient's request.  An incision was then made over the apex of the lesion and approximately 1 cc of purulent material was expressed. Loculations were not present. 1cm splinter removed from abscess cavity. The drainage cavity was then irrigated.     The patient tolerated the procedure well.    Complications: None         COURSE & MEDICAL DECISION MAKING     INITIAL ASSESSMENT, COURSE AND PLAN  Care Narrative: 3:39 PM - Patient seen and examined at bedside. The patient is a 38 year old female who presents for removal of a splinter from her right middle finger. The patient notes " that it drained some in the past day. PLan for I&D, possible FB removal. Patient agrees to the plan of care.     3:57 PM - Foreign body removal done by myself. See note above.     4:11 PM - The patient will be treated with Bactrim  mg.     4:22 PM - The patient was reevaluated at bedside. Discussed discharge instructions and return precautions with the patient and they were cleared for discharge. Patient was given the opportunity to ask any further questions. She is comfortable with discharge at this time.        PROBLEM LIST  #Right middle finger foreign body with abscess   -I&D done, prescribed bactrim    DISPOSITION AND DISCUSSIONS  I have discussed management of the patient with the following physicians and DEXTER's:  None.    Discussion of management with other QHP or appropriate source(s): None       Barriers to care at this time, including but not limited to: Patient does not have established PCP.     The patient will return for new or worsening symptoms and is stable at the time of discharge.    DISPOSITION:  Patient will be discharged home in stable condition.    FOLLOW UP:  [facility], Emergency Dept  [address] [place] [id number] [phone]    if swelling worsening come back    OUTPATIENT MEDICATIONS:  Discharge Medication List as of 1/4/2024  4:16 PM        START taking these medications    Details   sulfamethoxazole-trimethoprim (BACTRIM DS) 800-160 MG tablet Take 1 Tablet by mouth 2 times a day for 7 days., Disp-14 Tablet, R-0, Normal            FINAL DIAGNOSIS  1. Abscess of finger, right Active   2. Foreign body of finger with infection, initial encounter       I, Maryanne Tyler (Scribe), am scribing for, and in the presence of,  Dat Shaikh Ii, M.d.    Electronically signed by: Maryanne Tyler (Scribe), 1/4/2024    IDat Ii, M.d. personally performed the services described in this documentation, as scribed by  Maryanne Zambrnaos Jayson in my presence, and it is both accurate and complete.      The note accurately reflects work and decisions made by me.  Dat Shaikh II, M.D.  1/4/2024  6:31 PM

## 2024-01-05 NOTE — ED NOTES
Pt discharged to home. Pt was given follow up instructions and prescriptions for bactrim. Pt verbalized understanding of all instructions for discharge and is ambulatory out of ED with steady gait. AOx4

## 2024-01-06 ENCOUNTER — HOSPITAL ENCOUNTER (EMERGENCY)
Facility: MEDICAL CENTER | Age: 39
End: 2024-01-06
Attending: EMERGENCY MEDICINE
Payer: MEDICAID

## 2024-01-06 VITALS
HEIGHT: 63 IN | TEMPERATURE: 98.6 F | DIASTOLIC BLOOD PRESSURE: 67 MMHG | WEIGHT: 195.72 LBS | HEART RATE: 91 BPM | SYSTOLIC BLOOD PRESSURE: 110 MMHG | BODY MASS INDEX: 34.68 KG/M2 | OXYGEN SATURATION: 98 % | RESPIRATION RATE: 21 BRPM

## 2024-01-06 DIAGNOSIS — R55 NEAR SYNCOPE: ICD-10-CM

## 2024-01-06 LAB
ALBUMIN SERPL BCP-MCNC: 4.2 G/DL (ref 3.2–4.9)
ALBUMIN/GLOB SERPL: 1.1 G/DL
ALP SERPL-CCNC: 45 U/L (ref 30–99)
ALT SERPL-CCNC: 40 U/L (ref 2–50)
ANION GAP SERPL CALC-SCNC: 13 MMOL/L (ref 7–16)
AST SERPL-CCNC: 27 U/L (ref 12–45)
BASOPHILS # BLD AUTO: 0.4 % (ref 0–1.8)
BASOPHILS # BLD: 0.04 K/UL (ref 0–0.12)
BILIRUB SERPL-MCNC: 0.4 MG/DL (ref 0.1–1.5)
BUN SERPL-MCNC: 11 MG/DL (ref 8–22)
CALCIUM ALBUM COR SERPL-MCNC: 9 MG/DL (ref 8.5–10.5)
CALCIUM SERPL-MCNC: 9.2 MG/DL (ref 8.5–10.5)
CHLORIDE SERPL-SCNC: 105 MMOL/L (ref 96–112)
CO2 SERPL-SCNC: 22 MMOL/L (ref 20–33)
CREAT SERPL-MCNC: 0.73 MG/DL (ref 0.5–1.4)
EKG IMPRESSION: NORMAL
EOSINOPHIL # BLD AUTO: 0.14 K/UL (ref 0–0.51)
EOSINOPHIL NFR BLD: 1.5 % (ref 0–6.9)
ERYTHROCYTE [DISTWIDTH] IN BLOOD BY AUTOMATED COUNT: 47.5 FL (ref 35.9–50)
GFR SERPLBLD CREATININE-BSD FMLA CKD-EPI: 109 ML/MIN/1.73 M 2
GLOBULIN SER CALC-MCNC: 3.7 G/DL (ref 1.9–3.5)
GLUCOSE BLD STRIP.AUTO-MCNC: 108 MG/DL (ref 65–99)
GLUCOSE SERPL-MCNC: 85 MG/DL (ref 65–99)
HCG SERPL QL: NEGATIVE
HCT VFR BLD AUTO: 43.4 % (ref 37–47)
HGB BLD-MCNC: 13.6 G/DL (ref 12–16)
IMM GRANULOCYTES # BLD AUTO: 0.02 K/UL (ref 0–0.11)
IMM GRANULOCYTES NFR BLD AUTO: 0.2 % (ref 0–0.9)
LYMPHOCYTES # BLD AUTO: 3.06 K/UL (ref 1–4.8)
LYMPHOCYTES NFR BLD: 33.4 % (ref 22–41)
MCH RBC QN AUTO: 27 PG (ref 27–33)
MCHC RBC AUTO-ENTMCNC: 31.3 G/DL (ref 32.2–35.5)
MCV RBC AUTO: 86.3 FL (ref 81.4–97.8)
MONOCYTES # BLD AUTO: 0.56 K/UL (ref 0–0.85)
MONOCYTES NFR BLD AUTO: 6.1 % (ref 0–13.4)
NEUTROPHILS # BLD AUTO: 5.35 K/UL (ref 1.82–7.42)
NEUTROPHILS NFR BLD: 58.4 % (ref 44–72)
NRBC # BLD AUTO: 0 K/UL
NRBC BLD-RTO: 0 /100 WBC (ref 0–0.2)
PLATELET # BLD AUTO: 400 K/UL (ref 164–446)
PMV BLD AUTO: 10.2 FL (ref 9–12.9)
POTASSIUM SERPL-SCNC: 4.2 MMOL/L (ref 3.6–5.5)
PROT SERPL-MCNC: 7.9 G/DL (ref 6–8.2)
RBC # BLD AUTO: 5.03 M/UL (ref 4.2–5.4)
SODIUM SERPL-SCNC: 140 MMOL/L (ref 135–145)
WBC # BLD AUTO: 9.2 K/UL (ref 4.8–10.8)

## 2024-01-06 PROCEDURE — 99284 EMERGENCY DEPT VISIT MOD MDM: CPT

## 2024-01-06 PROCEDURE — 36415 COLL VENOUS BLD VENIPUNCTURE: CPT

## 2024-01-06 PROCEDURE — 82962 GLUCOSE BLOOD TEST: CPT

## 2024-01-06 PROCEDURE — 700105 HCHG RX REV CODE 258: Mod: UD | Performed by: EMERGENCY MEDICINE

## 2024-01-06 PROCEDURE — 85025 COMPLETE CBC W/AUTO DIFF WBC: CPT

## 2024-01-06 PROCEDURE — 93005 ELECTROCARDIOGRAM TRACING: CPT | Performed by: EMERGENCY MEDICINE

## 2024-01-06 PROCEDURE — 93005 ELECTROCARDIOGRAM TRACING: CPT

## 2024-01-06 PROCEDURE — 80053 COMPREHEN METABOLIC PANEL: CPT

## 2024-01-06 PROCEDURE — 84703 CHORIONIC GONADOTROPIN ASSAY: CPT

## 2024-01-06 RX ORDER — SODIUM CHLORIDE 9 MG/ML
1000 INJECTION, SOLUTION INTRAVENOUS ONCE
Status: COMPLETED | OUTPATIENT
Start: 2024-01-06 | End: 2024-01-06

## 2024-01-06 RX ADMIN — SODIUM CHLORIDE 1000 ML: 9 INJECTION, SOLUTION INTRAVENOUS at 19:03

## 2024-01-06 ASSESSMENT — PAIN DESCRIPTION - PAIN TYPE: TYPE: ACUTE PAIN

## 2024-01-06 ASSESSMENT — FIBROSIS 4 INDEX: FIB4 SCORE: 1.87

## 2024-01-07 NOTE — ED TRIAGE NOTES
"Jazmin Tong  38 y.o. female  Chief Complaint   Patient presents with    Near Syncopal     Pt reports she became weak and \"almost blacked out\" while she was talking on the phone.       Pt amb to triage with steady gait for above complaint. Pt denies LOC.  Pt is alert and oriented, speaking in full sentences, follows commands and responds appropriately to questions. Not in any apparent distress. Respirations are even and unlabored.  Pt placed in lobby. Pt educated on triage process. Pt encouraged to alert staff for any changes.    "

## 2024-01-07 NOTE — DISCHARGE INSTRUCTIONS
You were seen in the Emergency Department for near syncope    EKG, labs were completed without significant acute abnormalities.    Please use 1,000mg of tylenol or 600mg of ibuprofen every 6 hours as needed for pain.  Drink plenty of fluids.    Please follow up with your primary care physician.    Return to the Emergency Department with chest pain, trouble breathing, recurrent fainting, or other concerns.

## 2024-01-07 NOTE — ED PROVIDER NOTES
"ED Provider Note    CHIEF COMPLAINT  Chief Complaint   Patient presents with    Near Syncopal     Pt reports she became weak and \"almost blacked out\" while she was talking on the phone.     EXTERNAL RECORDS REVIEWED  Patient was last seen in the emergency department 2024 for a finger infection    HPI/ROS  LIMITATION TO HISTORY   Select: : None  OUTSIDE HISTORIAN(S):  None    Jazmin Tong is a 38 y.o. female who presents to the Emergency Department for evaluation of a near syncopal episode onset 2 hours ago. The patient reports she woke up from a nap and after a phone call began feeling like she was \"slipping out of consciousness\" and notes she couldn't walk or talk normally. She reports associated dizziness and weakness. She denies any headache, chest pain, or shortness of breath. She reports she ate lunch around 11:00 this morning consisting of a sandwich and chips, and ate an orange after the episode. The patient notes she was here for a splinter removal a couple of days ago and was prescribed antibiotics, but has not been able to pick them up yet. She reports her splinter injury has been feeling better since it was removed. The patient adds she is not pregnant. The patient states she recently detox from fentanyl after being in shelter.  She did smoke both heroin and crystal meth a few days ago however has not used since.  No history of recent IV drug use. She has been staying at the homeless shelter trying to stop using drugs.     PAST MEDICAL HISTORY  No past medical history noted.     SURGICAL HISTORY  Past Surgical History:   Procedure Laterality Date    GYN SURGERY       x2        FAMILY HISTORY  No family history noted..    SOCIAL HISTORY   reports that she has been smoking cigarettes. She has never used smokeless tobacco. She reports that she does not currently use alcohol. She reports current drug use. Drug: Inhaled.    CURRENT MEDICATIONS  Previous Medications    ALBUTEROL 108 (90 BASE) " "MCG/ACT AERO SOLN INHALATION AEROSOL    Inhale 2 Puffs every 6 hours as needed for Shortness of Breath.    BETAMETHASONE DIPROPIONATE 0.05 % LOTION    Apply 1 Application topically 2 times a day.    LEVONORGESTREL (MIRENA, 52 MG,) 52 MG (20 MCG/24 HR) IUD    1 Each by Intrauterine route Once.    LIDOCAINE (LIDODERM) 5 % PATCH    Place 1 Patch on the skin every 24 hours.    METHOCARBAMOL (ROBAXIN) 750 MG TAB    Take 2 Tablets by mouth 3 times a day.    NAPROXEN (NAPROSYN) 375 MG TAB    Take 1 Tablet by mouth 2 times a day with meals.    PERMETHRIN (ELIMITE) 5 % CREAM    Apply to affected areas topically from head to toe, leave on overnight, and wash off in the morning.    SULFAMETHOXAZOLE-TRIMETHOPRIM (BACTRIM DS) 800-160 MG TABLET    Take 1 Tablet by mouth 2 times a day for 7 days.       ALLERGIES  Patient has no known allergies.    PHYSICAL EXAM  /75   Pulse (!) 111   Temp 37.2 °C (99 °F) (Temporal)   Resp 16   Ht 1.6 m (5' 3\")   Wt 88.8 kg (195 lb 11.6 oz)   SpO2 99%      Constitutional: Nontoxic appearing. Alert in no apparent distress.  HENT: Normocephalic, Atraumatic. Bilateral external ears normal. Nose normal.  Moist mucous membranes.  Oropharynx clear.  Eyes: Pupils are equal and reactive. Conjunctiva normal.   Neck: Supple, full range of motion  Heart: Regular rate and rhythm.  No murmurs.    Lungs: No respiratory distress, normal work of breathing. Lungs clear to auscultation bilaterally.  Abdomen Soft, no distention.  No tenderness to palpation.  Musculoskeletal: Atraumatic. No obvious deformities noted.  No lower extremity edema.  Skin: Warm, Dry.  No erythema, No rash.   Neurologic: Alert and oriented x3. Moving all extremities spontaneously without focal deficits.  Psychiatric: Affect normal, Mood normal, Appears appropriate and not intoxicated.     DIAGNOSTIC STUDIES / PROCEDURES    EKG  I have independently interpreted this EKG  Results for orders placed or performed during the hospital " encounter of 24   EKG   Result Value Ref Range    Report       [facility] Emergency Dept.    Test Date:  2024  Pt Name:    APOLINAR MORSE                 Department: ER  MRN:        [id number]                      Room:  Gender:     Female                       Technician: [id number]  :        1985                   Requested By:ER TRIAGE PROTOCOL  Order #:    [id number]                    Reading MD: Bere Randall MD    Measurements  Intervals                                Axis  Rate:       98                           P:          72  UT:         143                          QRS:        -32  QRSD:       96                           T:          57  QT:         343  QTc:        438    Interpretive Statements  Sinus rhythm  Left axis deviation  Normal intervals, no ectopy  No ST or T wave change  Compared to ECG 2023 17:31:03  No significant changes  Electronically Signed On 2024 18:11:47 PST by Bere Randall MD          LABS  Labs Reviewed   CBC WITH DIFFERENTIAL - Abnormal; Notable for the following components:       Result Value    MCHC 31.3 (*)     All other components within normal limits   COMP METABOLIC PANEL - Abnormal; Notable for the following components:    Globulin 3.7 (*)     All other components within normal limits   POCT GLUCOSE DEVICE RESULTS - Abnormal; Notable for the following components:    POC Glucose, Blood 108 (*)     All other components within normal limits   HCG QUAL SERUM   ESTIMATED GFR          COURSE & MEDICAL DECISION MAKING  6:05 PM - Patient seen and examined at bedside. Discussed plan of care, including checking the patient's blood count and electrolytes. I informed the patient her EKG is reassuring. Patient agrees to the plan of care. The patient will be resuscitated with 1L NS IV. Ordered for EKG, CBC w/ diff, CMP, and HCG qual serum to evaluate her symptoms.      ED Observation Status? No; Patient does not meet criteria for ED  Observation.     INITIAL ASSESSMENT, COURSE AND PLAN  Care Narrative: Relatively healthy patient although with a history of drug use who presents following a near syncopal episode today.  She is well-appearing with reassuring vital signs on arrival other than mild tachycardia.  EKG does not show evidence of ischemia or dysrhythmia.  No evidence concerning for WPW, HOCM, Brugada syndrome, long QT.  Her labs are otherwise reassuring without anemia, electrolyte abnormality, renal dysfunction.  Patient is not pregnant.    7:13 PM - Upon reassessment, patient is resting comfortably with normal vital signs.  No new complaints at this time.  Discussed results with patient and/or family as well as importance of primary care follow up.  Patient understands plan of care and strict return precautions for new or changing symptoms.       ADDITIONAL PROBLEM LIST  Problem #1: Near syncope -workup reassuring, given IV fluids for possible dehydration        DISPOSITION AND DISCUSSIONS  Escalation of care considered, and ultimately not performed:diagnostic imaging    Barriers to care at this time, including but not limited to: Patient does not have established PCP, Patient is homeless, and Patient had difficult affording medications.       The patient will return for new or worsening symptoms and is stable at the time of discharge.    DISPOSITION:  Patient will be discharged home in stable condition.    FOLLOW UP:  [address] [address] [phone]  Call   to establish primary care physician    [facility], Emergency Dept  [address] [facility] [address] [phone]    If symptoms worsen      OUTPATIENT MEDICATIONS:  Discharge Medication List as of 1/6/2024  7:37 PM           FINAL DIAGNOSIS  1. Near syncope        The note accurately reflects work and decisions made by me.  Bere Randall M.D.  1/6/2024  8:13 PM     Vikas PAIGE (Scribe), am scribing for, and in  the presence ofBere M.D..    Electronically signed by: Vikas Granados (Scribe), 1/6/2024    IBere M.D. personally performed the services described in this documentation, as scribed by Vikas Granados in my presence, and it is both accurate and complete.

## 2024-01-07 NOTE — ED NOTES
Bedside report received from off going RN/tech: Irma, assumed care of patient.  POC discussed with patient. Call light within reach, all needs addressed at this time.       Fall risk interventions in place: Not Applicable (all applicable per [place] Fall risk assessment)   Continuous monitoring: Pulse Ox or Blood Pressure  IVF/IV medications: Infusion per MAR (List Med(s)) NS bolus  Oxygen: Room Air  Bedside sitter: Not Applicable   Isolation: Not Applicable

## 2024-01-10 ENCOUNTER — HOSPITAL ENCOUNTER (EMERGENCY)
Facility: MEDICAL CENTER | Age: 39
End: 2024-01-10
Attending: EMERGENCY MEDICINE
Payer: MEDICAID

## 2024-01-10 VITALS
HEIGHT: 63 IN | TEMPERATURE: 97.5 F | WEIGHT: 205.25 LBS | SYSTOLIC BLOOD PRESSURE: 115 MMHG | BODY MASS INDEX: 36.37 KG/M2 | OXYGEN SATURATION: 96 % | RESPIRATION RATE: 18 BRPM | DIASTOLIC BLOOD PRESSURE: 75 MMHG | HEART RATE: 100 BPM

## 2024-01-10 DIAGNOSIS — F11.93 OPIATE WITHDRAWAL (HCC): ICD-10-CM

## 2024-01-10 DIAGNOSIS — F11.90 OPIOID USE DISORDER: ICD-10-CM

## 2024-01-10 PROCEDURE — A9270 NON-COVERED ITEM OR SERVICE: HCPCS | Mod: UD | Performed by: EMERGENCY MEDICINE

## 2024-01-10 PROCEDURE — 700102 HCHG RX REV CODE 250 W/ 637 OVERRIDE(OP): Mod: UD | Performed by: EMERGENCY MEDICINE

## 2024-01-10 PROCEDURE — 99283 EMERGENCY DEPT VISIT LOW MDM: CPT

## 2024-01-10 RX ORDER — BUPRENORPHINE HYDROCHLORIDE AND NALOXONE HYDROCHLORIDE DIHYDRATE 8; 2 MG/1; MG/1
2 TABLET SUBLINGUAL DAILY
Qty: 6 TABLET | Refills: 0 | Status: SHIPPED | OUTPATIENT
Start: 2024-01-10 | End: 2024-01-13

## 2024-01-10 RX ORDER — BUPRENORPHINE 8 MG/1
8 TABLET SUBLINGUAL ONCE
Status: COMPLETED | OUTPATIENT
Start: 2024-01-10 | End: 2024-01-10

## 2024-01-10 RX ADMIN — BUPRENORPHINE HCL 8 MG: 8 TABLET SUBLINGUAL at 15:47

## 2024-01-10 ASSESSMENT — FIBROSIS 4 INDEX: FIB4 SCORE: 0.38

## 2024-01-10 NOTE — DISCHARGE PLANNING
Alert Team:    PT given Chemical Dependency Resource packet and writer highlighted organizations that are able to prescribe Suboxone. Pt also given RB info,  Health info, [facility] info, [facility], and [facility] info. Pt reported to writer that she intends to walk over to the [facility]. PT requested a urinalysis stating that she believes she has a yeast infection. Writer informed bedside RN. PT had no further questions.    Ki Moses RN, LENNY

## 2024-01-10 NOTE — ED PROVIDER NOTES
ED Provider Note    CHIEF COMPLAINT  Chief Complaint   Patient presents with    Drug Withdrawal     From Fentanyl  Last use 1/3/2024  Took leftover Suboxone today at home due to severity of symptoms       EXTERNAL RECORDS REVIEWED  External ED Note [facility] emergency department October for cellulitis    HPI/ROS  LIMITATION TO HISTORY   Select: : None  OUTSIDE HISTORIAN(S):  none    Jazmin Tong is a 38 y.o. female who presents with concern for fentanyl withdrawal.  Patient reports she has used opioids for over 20 years although just fentanyl over the last year.  She last used in the beginning of January.  She states that she still feels quite anxious although the more severe symptoms have subsided.  She is no longer having any vomiting or diarrhea.  Reports no areas of pain.  She has a follow-up for January 25 but nothing sooner.  She did take a leftover Suboxone this morning    PAST MEDICAL HISTORY   has a past medical history of Cellulitis, Fentanyl use disorder, mild, abuse (HCC), and Hepatitis C.    SURGICAL HISTORY   has a past surgical history that includes gyn surgery.    FAMILY HISTORY  History reviewed. No pertinent family history.    SOCIAL HISTORY  Social History     Tobacco Use    Smoking status: Every Day     Current packs/day: 0.50     Types: Cigarettes    Smokeless tobacco: Never   Vaping Use    Vaping Use: Every day    Substances: Nicotine   Substance and Sexual Activity    Alcohol use: Not Currently    Drug use: Not Currently     Types: Inhaled     Comment: hx. meth, heroin, marijuana, cocaine, fentanyl    Sexual activity: Not on file       CURRENT MEDICATIONS  Home Medications       Reviewed by Luna Allison R.N. (Registered Nurse) on 01/10/24 at 1308  Med List Status: Partial     Medication Last Dose Status   albuterol 108 (90 Base) MCG/ACT Aero Soln inhalation aerosol  Active   betamethasone dipropionate 0.05 % lotion  Active   levonorgestrel (MIRENA, 52 MG,) 52 mg (20 mcg/24 hr) IUD   "Active   lidocaine (LIDODERM) 5 % Patch  Active   methocarbamol (ROBAXIN) 750 MG Tab  Active   naproxen (NAPROSYN) 375 MG Tab  Active   permethrin (ELIMITE) 5 % Cream  Active   sulfamethoxazole-trimethoprim (BACTRIM DS) 800-160 MG tablet  Active                    ALLERGIES  No Known Allergies    PHYSICAL EXAM  VITAL SIGNS: /74   Pulse (!) 101   Temp 36.4 °C (97.5 °F) (Temporal)   Resp 14   Ht 1.6 m (5' 3\")   Wt 93.1 kg (205 lb 4 oz)   SpO2 98%   BMI 36.36 kg/m²      Pulse ox interpretation: I interpret this pulse ox as normal.  Constitutional: Alert mildly anxious appearing  HENT: No signs of trauma, Bilateral external ears normal, Nose normal.   Eyes: Pupils are equal and reactive, Conjunctiva normal, Non-icteric.   Neck: Normal range of motion, No tenderness, Supple, No stridor.   Cardiovascular: Tachycardic, regular rhythm, no murmurs.   Thorax & Lungs: Normal breath sounds, No respiratory distress, No wheezing, No chest tenderness.   Abdomen: Bowel sounds normal, Soft, No tenderness, No masses, No pulsatile masses. No peritoneal signs.  Skin: Warm, Dry, No erythema, No rash.   Back: No bony tenderness, No CVA tenderness.   Extremities: Intact distal pulses, No edema, No tenderness, No cyanosis,  Negative Viji's sign.   Musculoskeletal: Good range of motion in all major joints. No tenderness to palpation or major deformities noted.   Neurologic: Alert , Normal motor function, Normal sensory function, No focal deficits noted.   Psychiatric: Affect normal, Judgment normal, Mood normal.               DIAGNOSTIC STUDIES / PROCEDURES      COURSE & MEDICAL DECISION MAKING        INITIAL ASSESSMENT, COURSE AND PLAN  Care Narrative: 3:04 PM  Patient is evaluated at the bedside, at this point she does appear to be in mild withdrawal from opioid use disorder.  I discussed with her plan for treatment here as well as continued outpatient treatment and she is agreeable to this.  I  Based on presentation of " fentanyl withdrawal patient is diagnosed with opioid use disorder.  In discussion with the patient and with their desire to start treatment for opioid use disorder, Suboxone was initiated in the emergency department.  The patient was given an initial dose of box on and a subsequent 3-day prescription for the same.  patient is referred to [facility] for ongoing care and MAT. The patient was additionally given a take-home kit for naloxone for potential overdose.        PROBLEM LIST  # Opioid use disorder.  Patient started on Suboxone here, given 3-day starter pack as well as naloxone, she states she will go to the [facility] in the morning and over the next few days to ensure that she gets a spot for ongoing medication assisted therapy as an outpatient.  She has no other focal complaints to suggest other medical pathology at this time  DISPOSITION AND DISCUSSIONS      Barriers to care at this time, including but not limited to: Patient does not have established PCP.     Decision tools and prescription drugs considered including, but not limited to:  New medications as above .    The patient will return for new or worsening symptoms and is stable at the time of discharge.    The patient is referred to a primary physician for blood pressure management, diabetic screening, and for all other preventative health concerns.        DISPOSITION:  Patient will be discharged home in stable condition.    FOLLOW UP:  THE [address] [address]  [phone]          OUTPATIENT MEDICATIONS:  New Prescriptions    BUPRENORPHINE-NALOXONE (SUBOXONE) 8-2 MG SL TAB    Place 2 Tablets under the tongue every day for 3 days. meds to beds - charge to pharmacy dept         FINAL DIAGNOSIS  1. Opiate withdrawal (HCC)    2. Opioid use disorder           Electronically signed by: Jorge Kan M.D., 1/10/2024 3:04 PM

## 2024-01-10 NOTE — ED NOTES
"Pt discharged to home w/ steady gait. Pt A&Ox4 on RA. Pt in possession of belongings. Pt provided discharge education and information pertaining to medications and follow up appointments. Pt received copy of discharge instructions and verbalized understanding. /75   Pulse 100   Temp 36.4 °C (97.5 °F) (Temporal)   Resp 18   Ht 1.6 m (5' 3\")   Wt 93.1 kg (205 lb 4 oz)   SpO2 96%   BMI 36.36 kg/m²   "

## 2024-01-10 NOTE — ED NOTES
Pt amb to room with steady gait. Agree with triage note. Pt hooked up to monitors. Chart up for ERP.

## 2024-01-10 NOTE — ED TRIAGE NOTES
Jazmin Tong  38 y.o.  Chief Complaint   Patient presents with    Drug Withdrawal     From Fentanyl  Last use 1/3/2024  Took leftover Suboxone today at home due to severity of symptoms     Ambulatory to triage with steady gait for above. A & O x 4, GCS 15.    States that she has been trying to detox on her own, but that the symptoms got too severe today so she took 1/4 Suboxone tablet this morning.    States that she has an appointment 1/25 at [facility] to help with detox, but thinks that she will not be able to make it to that date without using due to symptom severity.    Triage process explained to patient, apologized for wait time, and returned to [facility].

## 2024-01-11 NOTE — ED NOTES
Naloxone 4 mg/0.1 mL nasal spray was dispensed to the patient for prevention of potential opioid related overdose per protocol.   Counseling was provided regarding:  - Information relating to the recognition, prevention and responses to opioid-related drug overdoses  - How to safely administer the naloxone nasal spray  - Potential side effects and adverse events related to the naloxone nasal spray  - The importance of seeking immediate emergency medical assistance for a person experiencing an opioid-related drug overdose, even after the administration of naloxone  - The immunity from certain civil or criminal liabilities for seeking medical assistance for a person experiencing an opioid-related overdose    Rogelio Argueta, PharmD